# Patient Record
Sex: MALE | Race: WHITE | NOT HISPANIC OR LATINO | Employment: FULL TIME | URBAN - NONMETROPOLITAN AREA
[De-identification: names, ages, dates, MRNs, and addresses within clinical notes are randomized per-mention and may not be internally consistent; named-entity substitution may affect disease eponyms.]

---

## 2021-12-11 ENCOUNTER — APPOINTMENT (OUTPATIENT)
Dept: LAB | Facility: HOSPITAL | Age: 51
End: 2021-12-11
Attending: STUDENT IN AN ORGANIZED HEALTH CARE EDUCATION/TRAINING PROGRAM
Payer: COMMERCIAL

## 2021-12-11 DIAGNOSIS — Z01.818 PREOP TESTING: ICD-10-CM

## 2021-12-11 LAB
ANION GAP SERPL CALCULATED.3IONS-SCNC: 5 MMOL/L (ref 4–13)
BUN SERPL-MCNC: 15 MG/DL (ref 5–25)
CALCIUM SERPL-MCNC: 8.7 MG/DL (ref 8.3–10.1)
CHLORIDE SERPL-SCNC: 97 MMOL/L (ref 100–108)
CO2 SERPL-SCNC: 31 MMOL/L (ref 21–32)
CREAT SERPL-MCNC: 1.31 MG/DL (ref 0.6–1.3)
ERYTHROCYTE [DISTWIDTH] IN BLOOD BY AUTOMATED COUNT: 13.7 % (ref 11.6–15.1)
GFR SERPL CREATININE-BSD FRML MDRD: 63 ML/MIN/1.73SQ M
GLUCOSE P FAST SERPL-MCNC: 245 MG/DL (ref 65–99)
HCT VFR BLD AUTO: 41.3 % (ref 36.5–49.3)
HGB BLD-MCNC: 14.3 G/DL (ref 12–17)
MCH RBC QN AUTO: 28.9 PG (ref 26.8–34.3)
MCHC RBC AUTO-ENTMCNC: 34.6 G/DL (ref 31.4–37.4)
MCV RBC AUTO: 83 FL (ref 82–98)
PLATELET # BLD AUTO: 297 THOUSANDS/UL (ref 149–390)
PMV BLD AUTO: 11 FL (ref 8.9–12.7)
POTASSIUM SERPL-SCNC: 3.4 MMOL/L (ref 3.5–5.3)
RBC # BLD AUTO: 4.95 MILLION/UL (ref 3.88–5.62)
SODIUM SERPL-SCNC: 133 MMOL/L (ref 136–145)
WBC # BLD AUTO: 8.55 THOUSAND/UL (ref 4.31–10.16)

## 2021-12-11 PROCEDURE — 80048 BASIC METABOLIC PNL TOTAL CA: CPT

## 2021-12-11 PROCEDURE — 85027 COMPLETE CBC AUTOMATED: CPT

## 2021-12-11 PROCEDURE — 36415 COLL VENOUS BLD VENIPUNCTURE: CPT

## 2021-12-14 ENCOUNTER — ANESTHESIA EVENT (OUTPATIENT)
Dept: PERIOP | Facility: HOSPITAL | Age: 51
End: 2021-12-14
Payer: COMMERCIAL

## 2021-12-14 RX ORDER — SPIRONOLACTONE 25 MG/1
25 TABLET ORAL DAILY
COMMUNITY

## 2021-12-14 RX ORDER — ATORVASTATIN CALCIUM 40 MG/1
40 TABLET, FILM COATED ORAL DAILY
COMMUNITY

## 2021-12-14 RX ORDER — FUROSEMIDE 40 MG/1
40 TABLET ORAL DAILY
COMMUNITY

## 2021-12-14 RX ORDER — ASPIRIN 81 MG/1
81 TABLET ORAL DAILY
COMMUNITY

## 2021-12-14 RX ORDER — CETIRIZINE HYDROCHLORIDE 10 MG/1
10 TABLET ORAL DAILY
COMMUNITY

## 2021-12-14 RX ORDER — LEVOTHYROXINE SODIUM 0.05 MG/1
50 TABLET ORAL DAILY
COMMUNITY

## 2021-12-14 RX ORDER — LOSARTAN POTASSIUM 100 MG/1
100 TABLET ORAL DAILY
COMMUNITY

## 2021-12-27 PROBLEM — I10 HTN (HYPERTENSION): Status: ACTIVE | Noted: 2021-12-27

## 2021-12-27 PROBLEM — N18.9 CHRONIC KIDNEY DISEASE: Status: ACTIVE | Noted: 2021-12-27

## 2021-12-28 ENCOUNTER — ANESTHESIA (OUTPATIENT)
Dept: PERIOP | Facility: HOSPITAL | Age: 51
End: 2021-12-28
Payer: COMMERCIAL

## 2021-12-28 ENCOUNTER — HOSPITAL ENCOUNTER (OUTPATIENT)
Facility: HOSPITAL | Age: 51
Setting detail: OUTPATIENT SURGERY
Discharge: HOME | End: 2021-12-28
Attending: STUDENT IN AN ORGANIZED HEALTH CARE EDUCATION/TRAINING PROGRAM | Admitting: STUDENT IN AN ORGANIZED HEALTH CARE EDUCATION/TRAINING PROGRAM
Payer: COMMERCIAL

## 2021-12-28 VITALS
WEIGHT: 235 LBS | RESPIRATION RATE: 18 BRPM | BODY MASS INDEX: 34.8 KG/M2 | HEIGHT: 69 IN | SYSTOLIC BLOOD PRESSURE: 148 MMHG | OXYGEN SATURATION: 97 % | HEART RATE: 89 BPM | DIASTOLIC BLOOD PRESSURE: 75 MMHG | TEMPERATURE: 97.9 F

## 2021-12-28 DIAGNOSIS — K42.9 UMBILICAL HERNIA WITHOUT OBSTRUCTION OR GANGRENE: ICD-10-CM

## 2021-12-28 LAB
GLUCOSE SERPL-MCNC: 278 MG/DL (ref 65–140)
GLUCOSE SERPL-MCNC: 290 MG/DL (ref 65–140)

## 2021-12-28 PROCEDURE — 93005 ELECTROCARDIOGRAM TRACING: CPT

## 2021-12-28 PROCEDURE — 82948 REAGENT STRIP/BLOOD GLUCOSE: CPT

## 2021-12-28 PROCEDURE — 88302 TISSUE EXAM BY PATHOLOGIST: CPT | Performed by: PATHOLOGY

## 2021-12-28 PROCEDURE — 49585 PR REPAIR UMBILICAL HERN,5+Y/O,REDUC: CPT

## 2021-12-28 RX ORDER — ONDANSETRON 2 MG/ML
4 INJECTION INTRAMUSCULAR; INTRAVENOUS ONCE AS NEEDED
Status: DISCONTINUED | OUTPATIENT
Start: 2021-12-28 | End: 2021-12-28 | Stop reason: HOSPADM

## 2021-12-28 RX ORDER — MIDAZOLAM HYDROCHLORIDE 2 MG/2ML
INJECTION, SOLUTION INTRAMUSCULAR; INTRAVENOUS AS NEEDED
Status: DISCONTINUED | OUTPATIENT
Start: 2021-12-28 | End: 2021-12-28

## 2021-12-28 RX ORDER — KETOROLAC TROMETHAMINE 30 MG/ML
INJECTION, SOLUTION INTRAMUSCULAR; INTRAVENOUS AS NEEDED
Status: DISCONTINUED | OUTPATIENT
Start: 2021-12-28 | End: 2021-12-28

## 2021-12-28 RX ORDER — HYDROMORPHONE HCL/PF 1 MG/ML
0.25 SYRINGE (ML) INJECTION
Status: DISCONTINUED | OUTPATIENT
Start: 2021-12-28 | End: 2021-12-28 | Stop reason: HOSPADM

## 2021-12-28 RX ORDER — CEFAZOLIN SODIUM 2 G/50ML
2000 SOLUTION INTRAVENOUS ONCE
Status: COMPLETED | OUTPATIENT
Start: 2021-12-28 | End: 2021-12-28

## 2021-12-28 RX ORDER — DEXAMETHASONE SODIUM PHOSPHATE 4 MG/ML
INJECTION, SOLUTION INTRA-ARTICULAR; INTRALESIONAL; INTRAMUSCULAR; INTRAVENOUS; SOFT TISSUE AS NEEDED
Status: DISCONTINUED | OUTPATIENT
Start: 2021-12-28 | End: 2021-12-28

## 2021-12-28 RX ORDER — ROCURONIUM BROMIDE 10 MG/ML
INJECTION, SOLUTION INTRAVENOUS AS NEEDED
Status: DISCONTINUED | OUTPATIENT
Start: 2021-12-28 | End: 2021-12-28

## 2021-12-28 RX ORDER — LIDOCAINE HYDROCHLORIDE AND EPINEPHRINE 10; 10 MG/ML; UG/ML
INJECTION, SOLUTION INFILTRATION; PERINEURAL AS NEEDED
Status: DISCONTINUED | OUTPATIENT
Start: 2021-12-28 | End: 2021-12-28 | Stop reason: HOSPADM

## 2021-12-28 RX ORDER — DIPHENHYDRAMINE HYDROCHLORIDE 50 MG/ML
12.5 INJECTION INTRAMUSCULAR; INTRAVENOUS ONCE AS NEEDED
Status: DISCONTINUED | OUTPATIENT
Start: 2021-12-28 | End: 2021-12-28 | Stop reason: HOSPADM

## 2021-12-28 RX ORDER — FENTANYL CITRATE 50 UG/ML
INJECTION, SOLUTION INTRAMUSCULAR; INTRAVENOUS AS NEEDED
Status: DISCONTINUED | OUTPATIENT
Start: 2021-12-28 | End: 2021-12-28

## 2021-12-28 RX ORDER — ONDANSETRON 2 MG/ML
INJECTION INTRAMUSCULAR; INTRAVENOUS AS NEEDED
Status: DISCONTINUED | OUTPATIENT
Start: 2021-12-28 | End: 2021-12-28

## 2021-12-28 RX ORDER — FENTANYL CITRATE/PF 50 MCG/ML
25 SYRINGE (ML) INJECTION
Status: DISCONTINUED | OUTPATIENT
Start: 2021-12-28 | End: 2021-12-28 | Stop reason: HOSPADM

## 2021-12-28 RX ORDER — PROPOFOL 10 MG/ML
INJECTION, EMULSION INTRAVENOUS AS NEEDED
Status: DISCONTINUED | OUTPATIENT
Start: 2021-12-28 | End: 2021-12-28

## 2021-12-28 RX ORDER — SODIUM CHLORIDE, SODIUM LACTATE, POTASSIUM CHLORIDE, CALCIUM CHLORIDE 600; 310; 30; 20 MG/100ML; MG/100ML; MG/100ML; MG/100ML
INJECTION, SOLUTION INTRAVENOUS CONTINUOUS PRN
Status: DISCONTINUED | OUTPATIENT
Start: 2021-12-28 | End: 2021-12-28

## 2021-12-28 RX ORDER — LIDOCAINE HYDROCHLORIDE 10 MG/ML
INJECTION, SOLUTION EPIDURAL; INFILTRATION; INTRACAUDAL; PERINEURAL AS NEEDED
Status: DISCONTINUED | OUTPATIENT
Start: 2021-12-28 | End: 2021-12-28

## 2021-12-28 RX ADMIN — LIDOCAINE HYDROCHLORIDE 50 MG: 10 INJECTION, SOLUTION EPIDURAL; INFILTRATION; INTRACAUDAL at 10:20

## 2021-12-28 RX ADMIN — SUGAMMADEX 200 MG: 100 INJECTION, SOLUTION INTRAVENOUS at 11:06

## 2021-12-28 RX ADMIN — KETOROLAC TROMETHAMINE 30 MG: 30 INJECTION, SOLUTION INTRAMUSCULAR at 10:57

## 2021-12-28 RX ADMIN — FENTANYL CITRATE 50 MCG: 50 INJECTION INTRAMUSCULAR; INTRAVENOUS at 11:08

## 2021-12-28 RX ADMIN — ONDANSETRON 4 MG: 2 INJECTION INTRAMUSCULAR; INTRAVENOUS at 10:20

## 2021-12-28 RX ADMIN — MIDAZOLAM 2 MG: 1 INJECTION INTRAMUSCULAR; INTRAVENOUS at 10:14

## 2021-12-28 RX ADMIN — ROCURONIUM BROMIDE 50 MG: 50 INJECTION, SOLUTION INTRAVENOUS at 10:20

## 2021-12-28 RX ADMIN — CEFAZOLIN SODIUM 2000 MG: 2 SOLUTION INTRAVENOUS at 10:15

## 2021-12-28 RX ADMIN — PROPOFOL 200 MG: 10 INJECTION, EMULSION INTRAVENOUS at 10:20

## 2021-12-28 RX ADMIN — DEXAMETHASONE SODIUM PHOSPHATE 4 MG: 4 INJECTION, SOLUTION INTRAMUSCULAR; INTRAVENOUS at 10:20

## 2021-12-28 RX ADMIN — SODIUM CHLORIDE, SODIUM LACTATE, POTASSIUM CHLORIDE, AND CALCIUM CHLORIDE: .6; .31; .03; .02 INJECTION, SOLUTION INTRAVENOUS at 10:16

## 2021-12-28 RX ADMIN — FENTANYL CITRATE 50 MCG: 50 INJECTION INTRAMUSCULAR; INTRAVENOUS at 10:20

## 2021-12-28 RX ADMIN — INSULIN HUMAN 5 UNITS: 100 INJECTION, SOLUTION PARENTERAL at 10:28

## 2021-12-29 LAB
ATRIAL RATE: 87 BPM
P AXIS: 57 DEGREES
PR INTERVAL: 170 MS
QRS AXIS: 38 DEGREES
QRSD INTERVAL: 96 MS
QT INTERVAL: 382 MS
QTC INTERVAL: 459 MS
T WAVE AXIS: 43 DEGREES
VENTRICULAR RATE: 87 BPM

## 2024-05-04 ENCOUNTER — HOSPITAL ENCOUNTER (OUTPATIENT)
Dept: ULTRASOUND IMAGING | Facility: HOSPITAL | Age: 54
Discharge: HOME | End: 2024-05-04
Payer: COMMERCIAL

## 2024-05-04 DIAGNOSIS — R22.33 AXILLARY LUMP, BILATERAL: ICD-10-CM

## 2024-05-04 PROCEDURE — 76882 US LMTD JT/FCL EVL NVASC XTR: CPT

## 2024-05-08 DIAGNOSIS — R59.0 LOCALIZED ENLARGED LYMPH NODES: ICD-10-CM

## 2024-05-08 DIAGNOSIS — R22.33 LOCALIZED SWELLING, MASS AND LUMP, UPPER LIMB, BILATERAL: ICD-10-CM

## 2024-05-16 ENCOUNTER — HOSPITAL ENCOUNTER (OUTPATIENT)
Dept: CT IMAGING | Facility: HOSPITAL | Age: 54
Discharge: HOME | End: 2024-05-16
Payer: COMMERCIAL

## 2024-05-16 DIAGNOSIS — R22.33 LOCALIZED SWELLING, MASS AND LUMP, UPPER LIMB, BILATERAL: ICD-10-CM

## 2024-05-16 DIAGNOSIS — R59.0 LOCALIZED ENLARGED LYMPH NODES: ICD-10-CM

## 2024-05-16 PROCEDURE — 71260 CT THORAX DX C+: CPT

## 2024-05-16 RX ADMIN — IOHEXOL 85 ML: 350 INJECTION, SOLUTION INTRAVENOUS at 16:47

## 2024-07-12 ENCOUNTER — CONSULT (OUTPATIENT)
Dept: PAIN MEDICINE | Facility: CLINIC | Age: 54
End: 2024-07-12
Payer: COMMERCIAL

## 2024-07-12 VITALS
SYSTOLIC BLOOD PRESSURE: 122 MMHG | HEART RATE: 70 BPM | BODY MASS INDEX: 33.77 KG/M2 | RESPIRATION RATE: 18 BRPM | DIASTOLIC BLOOD PRESSURE: 78 MMHG | OXYGEN SATURATION: 96 % | WEIGHT: 228 LBS | TEMPERATURE: 97.3 F | HEIGHT: 69 IN

## 2024-07-12 DIAGNOSIS — M54.12 CERVICAL RADICULOPATHY: Primary | ICD-10-CM

## 2024-07-12 PROCEDURE — 99244 OFF/OP CNSLTJ NEW/EST MOD 40: CPT | Performed by: ANESTHESIOLOGY

## 2024-07-12 RX ORDER — MELOXICAM 7.5 MG/1
7.5 TABLET ORAL 2 TIMES DAILY PRN
Qty: 60 TABLET | Refills: 2 | Status: SHIPPED | OUTPATIENT
Start: 2024-07-12

## 2024-07-12 RX ORDER — GABAPENTIN 300 MG/1
300 CAPSULE ORAL 3 TIMES DAILY
Qty: 90 CAPSULE | Refills: 2 | Status: SHIPPED | OUTPATIENT
Start: 2024-07-12

## 2024-07-12 NOTE — PATIENT INSTRUCTIONS
Patient Education     Neck Pain Exercises   About this topic   The neck or cervical spine has 7 spinal bones that run from the base of your skull to the upper back. These spinal bones have discs in between them. Discs act as shock absorbers. Ligaments are strong bands of tissue that hold the bones together. Many muscles surround and attach on these bones. Nerves come off of the spinal cord and exit out of small spaces in between the spinal bones. You can have neck pain if any of these are injured or damaged. Exercises may help to make this problem better.  General   Before starting with a program, ask your doctor if you are healthy enough to do these exercises. Your doctor may have you work with a  or physical therapist to make a safe exercise program to meet your needs. You should not do the exercises if they cause sharp pains, if you feel dizzy, or if you have vision changes.  Stretching Exercises   Stretching exercises keep your muscles flexible. They also stop them from getting tight. Start by doing each of these stretches 2 to 3 times. In order for your body to make changes, you will need to hold these stretches for 20 to 30 seconds. Try to do the stretches 2 to 3 times each day. Do all exercises slowly.  Passive neck stretches:  Put your left hand on top of your head. Your other arm can be at your side or behind your back. Pull your head toward your left shoulder until you feel a gentle stretch on the right side of your neck. Repeat on the other side using your other hand.  Also, try this stretch by pulling in a diagonal direction. With your left hand on top of your head, pull your head down towards the direction of your left knee. You should feel this stretch toward the back on the right side of your neck. Repeat on the other side.  Active neck stretches:  Neck front-to-back motion ? Look down to the floor until you feel a stretch in the back of your neck. Hold. Next, look up to the ceiling until you  feel a stretch in the front of your neck. Hold.  Neck side-to-side motion ? Tilt your head to the side and bring your ear to your shoulder until you feel a stretch on the other side of your neck. Hold. Next, tilt your head to the other side until you feel a stretch. Hold.  Neck turning ? Turn only your head and look over your left shoulder until you feel stretching in the right side of your neck. Hold. Now turn only your head and look over your right shoulder until you feel a stretch in the left side of your neck. Hold.  Scalene stretches ? Grasp your head with the hand opposite the side you want to stretch. Pull your head to the side until you feel a stretch. Now, slowly turn your head so your chin is pointed upwards.  Chin tucks ? Stand straight or lie down on your back. Tuck your chin in and lengthen the back of your neck. Return to the starting position and repeat. It may help to stand up against a wall during this exercise. Try gently pushing your chin with two fingers while trying to flatten your neck against the wall. If you do this exercise lying down, try using a small rolled up washcloth under your neck. Push down into the washcloth when tucking in your chin.  Strengthening Exercises   Strengthening exercises keep your muscles firm and strong. Start by repeating each exercise 2 to 3 times. Work up to doing each exercise 10 times. Try to do the exercises 2 to 3 times each day. Hold each exercise for 3 to 5 seconds. Do all exercises slowly.  Shoulder blade squeezes ? Pinch your shoulder blades together on your upper back and hold 3 to 5 seconds. Relax.             What will the results be?   Less pain and stiffness  Better range of motion  Increased strength  Help you heal faster after an injury or surgery  Increase blood flow to a body part  Help you feel better and more relaxed  Give you more energy  More toned looking muscles  Better posture  Easier to do daily activities  Helpful tips   Stay active and  work out to keep your muscles strong and flexible.  Be sure you do not hold your breath when exercising. This can raise your blood pressure. If you tend to hold your breath, try counting out loud when exercising. If any exercise bothers you, stop right away.  Try swinging your arms at an easy pace for a few minutes to warm up your muscles. Do this again after exercising.  Doing exercises before a meal may be a good way to get into a routine.  Exercise may be slightly uncomfortable, but you should not have sharp pains. If you do get sharp pains, stop what you are doing. If the sharp pains continue, call your doctor.  Last Reviewed Date   2020-03-10  Consumer Information Use and Disclaimer   This generalized information is a limited summary of diagnosis, treatment, and/or medication information. It is not meant to be comprehensive and should be used as a tool to help the user understand and/or assess potential diagnostic and treatment options. It does NOT include all information about conditions, treatments, medications, side effects, or risks that may apply to a specific patient. It is not intended to be medical advice or a substitute for the medical advice, diagnosis, or treatment of a health care provider based on the health care provider's examination and assessment of a patient’s specific and unique circumstances. Patients must speak with a health care provider for complete information about their health, medical questions, and treatment options, including any risks or benefits regarding use of medications. This information does not endorse any treatments or medications as safe, effective, or approved for treating a specific patient. [employer] and its affiliates disclaim any warranty or liability relating to this information or the use thereof. The use of this information is governed by the Terms of Use, available at https://www.[employer] Build[employer]uwer.com/en/know/clinical-effectiveness-terms   Copyright   Copyright ©  2024 [employer] and its affiliates and/or licensors. All rights reserved.
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
## 2024-07-24 ENCOUNTER — EVALUATION (OUTPATIENT)
Dept: PHYSICAL THERAPY | Facility: CLINIC | Age: 54
End: 2024-07-24
Payer: COMMERCIAL

## 2024-07-24 DIAGNOSIS — M54.12 CERVICAL RADICULOPATHY: ICD-10-CM

## 2024-07-24 PROCEDURE — 97162 PT EVAL MOD COMPLEX 30 MIN: CPT

## 2024-07-24 PROCEDURE — 97535 SELF CARE MNGMENT TRAINING: CPT

## 2024-07-24 NOTE — PROGRESS NOTES
PT Evaluation     Today's date: 2024  Patient name: Moise Flynn  : 1970  MRN: [id number]  Referring provider: Bhavesh Jha MD  Dx:   Encounter Diagnosis     ICD-10-CM    1. Cervical radiculopathy  M54.12 Ambulatory referral to Physical Therapy                     Assessment  Impairments: abnormal or restricted ROM, activity intolerance, impaired physical strength, lacks appropriate home exercise program, pain with function, unable to perform ADL, activity limitations and endurance  Symptom irritability: moderate    Assessment details: Pt is a 54 year old male who presents to OP PT for cervical radiculopathy. Upon examination, patient presents with (+) neural tension, (+) Spurlings, decreased ROM and WFL UE strength. Due to his current impairments patient has difficulty with sleeping, prolonged positioning of UE, driving and performing hobbies. Pt would benefit from OP PT services in order to address current impairments and functional limitations. Thank you for your referral!          Goals  STG (to be met within 4 weeks):  1.  Pt will reports no more than 4/10 pain at worst in order to improve function   2. Pt will improve cervical flexion and extension ROM by at least 5* in order to improve cervical mobility  3. Pt will improve cervical R& L lateral flexion ROM to next least restrictive level in order to improve cervical mobility  4. Pt will improve cervical R& L rotation ROM to next least restrictive level in order to improve cervical mobility  5. Pt will improve cervical vertebrae mobility to WFL in order to improve posture  6. Pt will improve FOTO score by at least 5 points in order to progress to prior level of function    LTG (to be met in 8 weeks):  1. Pt will report no more than 2/10 pain at worst in order to complete ADLs  2. Pt will be able to tolerate work related activities without radicular symptoms in order to improve function  3. Pt will be able to perform ADLs and hobbies without  pain in order to return to PLOF  4. Pt will achieve FOTO discharge in order to improve QOL  5. Pt to be independent with HEP     Plan  Patient would benefit from: skilled physical therapy  Planned modality interventions: thermotherapy: hydrocollator packs and cryotherapy    Planned therapy interventions: joint mobilization, manual therapy, neuromuscular re-education, patient education, strengthening, stretching, therapeutic exercise, home exercise program and balance    Frequency: 2x week  Duration in weeks: 6  Treatment plan discussed with: patient  Plan details: PT discussed goals and expectations of OPPT; pt understanding of POC and HEP provided.      Subjective Evaluation    History of Present Illness  Mechanism of injury: In April he was umping and had a baseball hit his L shoulder. It has been constantly sore since. Over the past 2-3 months, he has been dealing with numbness in his L arm and pain in his forearm. At times, his hand will go completely numb. Currently taking Meloxicam and Gabapentin, his sleeping has been slightly better since starting them. He typically does not have neck pain but he will get some discomfort if he is in a sustained position such as being on the computer or his phone.   Patient Goals  Patient goals for therapy: increased strength, independence with ADLs/IADLs, return to sport/leisure activities, increased motion, improved balance and decreased pain    Pain  Current pain ratin  At best pain ratin  At worst pain ratin  Location: Entire LUE  Quality: radiating, throbbing, dull ache and pressure    Hand dominance: left    Treatments  Current treatment: medication and physical therapy        Objective     Tenderness   Cervical Spine   No tenderness in the spinous process.     Neurological Testing     Sensation   Cervical/Thoracic   Left   Intact: light touch  Diminished: light touch    Right   Intact: light touch    Comments   Left light touch: Diminished 2-4 fingers.      Active Range of Motion   Cervical/Thoracic Spine       Cervical    Flexion:  WFL  Extension:  Restriction level: minimal  Left lateral flexion:  Restriction level: moderate  Right lateral flexion:  Restriction level moderate  Left rotation:  Restriction level: minimal  Right rotation:  Restriction level: minimal    Joint Play     Hypomobile: C4, C5, C6 and C7     Strength/Myotome Testing   Cervical Spine     Left   Normal strength    Right   Normal strength    Tests   Cervical     Left   Positive Spurling's Test A.     Right   Negative Spurling's Test A.     Left Shoulder   Positive ULTT3.       Flowsheet Rows      Flowsheet Row Most Recent Value   PT/OT G-Codes    Current Score 61   Projected Score 70               Precautions: HTN  POC Expiration: 8/24/24  Manuals 7/24       Cervical mobs grade III-IV, STM        Manual traction/SOR        PA T/S mobs                Neuro Re-Ed        Chin tucks        Chin tuck with OP        Chin tuck with extension        Chin tuck with rotation        Prone Y, T, I                                        TherEx        UBE to improve ROM/postural awareness        Doorway stretch        Can shoulder /, IR        TB ER        TB 90/90        Cervical SNAGs                        Instructed HEP & education 10'       Modalities

## 2024-07-26 NOTE — PROGRESS NOTES
"Daily Note     Today's date: 2024  Patient name: Moise Flynn  : 1970  MRN: [id number]  Referring provider: Bhavesh Jha MD  Dx:   Encounter Diagnosis     ICD-10-CM    1. Cervical radiculopathy  M54.12                      Subjective: Patient reports to PT ready and enthusiastic to begin his program this afternoon.      Objective: See treatment diary below      Assessment: Tolerated treatment well. Patient exhibited good technique with therapeutic exercises and would benefit from continued PT to increase cervical ROM/strength and endurance to improve his mobility.      Plan: Continue per plan of care.      Precautions: HTN  POC Expiration: 24  Manuals       Cervical mobs grade III-IV, STM  10'      Manual traction/SOR  2'      PA T/S mobs  3'              Neuro Re-Ed        Chin tucks  10x5\"      Chin tuck with OP        Chin tuck with extension        Chin tuck with rotation        Prone Y, T, I  2x10ea                                      TherEx        UBE to improve ROM/postural awareness  10' 1.0alt      Doorway stretch   3 Way 3x15\"ea      Can shoulder /, IR  NV      TB ER        TB 90/90        Cervical SNAGs  5x15\"bilat                      Instructed HEP & education 10'       Modalities                         "

## 2024-07-29 ENCOUNTER — OFFICE VISIT (OUTPATIENT)
Dept: PHYSICAL THERAPY | Facility: CLINIC | Age: 54
End: 2024-07-29
Payer: COMMERCIAL

## 2024-07-29 DIAGNOSIS — M54.12 CERVICAL RADICULOPATHY: Primary | ICD-10-CM

## 2024-07-29 PROCEDURE — 97140 MANUAL THERAPY 1/> REGIONS: CPT

## 2024-07-29 PROCEDURE — 97112 NEUROMUSCULAR REEDUCATION: CPT

## 2024-07-29 PROCEDURE — 97110 THERAPEUTIC EXERCISES: CPT

## 2024-08-01 ENCOUNTER — OFFICE VISIT (OUTPATIENT)
Dept: PHYSICAL THERAPY | Facility: CLINIC | Age: 54
End: 2024-08-01
Payer: COMMERCIAL

## 2024-08-01 DIAGNOSIS — M54.12 CERVICAL RADICULOPATHY: Primary | ICD-10-CM

## 2024-08-01 PROCEDURE — 97140 MANUAL THERAPY 1/> REGIONS: CPT

## 2024-08-01 PROCEDURE — 97110 THERAPEUTIC EXERCISES: CPT

## 2024-08-01 PROCEDURE — 97112 NEUROMUSCULAR REEDUCATION: CPT

## 2024-08-01 NOTE — PROGRESS NOTES
"Daily Note     Today's date: 2024  Patient name: Moise Flynn  : 1970  MRN: [id number]  Referring provider: Bhavesh Jha MD  Dx:   Encounter Diagnosis     ICD-10-CM    1. Cervical radiculopathy  M54.12                      Subjective: Patient notes that he had a difficult time sleeping post last session; however, does note that he feels improvement in tightness since first session.      Objective: See treatment diary below      Assessment: Tolerated treatment well. Patient exhibited good technique with therapeutic exercises and would benefit from continued PT to increase cervical ROM/strength and endurance to improve his mobility.      Plan: Continue per plan of care.      Precautions: HTN  POC Expiration: 24  Manuals      Cervical mobs grade III-IV, STM  10' 5'     Manual traction/SOR  2' 5'     PA T/S mobs  3' 5'             Neuro Re-Ed       Chin tucks  10x5\" 10x5\"     Chin tuck with OP        Chin tuck with extension        Chin tuck with rotation        Prone Y, T, I  2x10ea 2x10ea                                     TherEx       UBE to improve ROM/postural awareness  10' 1.0alt 10' 1.0alt     Doorway stretch   3 Way 3x15\"ea 3 Way   3x15\"zenon     Cane shoulder /, IR  NV 10x5\"zenon     TB ER        TB 90/90        Cervical SNAGs  5x15\"bilat 5x15\"bilat     UT/LS Stretch   5x15\"zenon bilat             Instructed HEP & education 10'       Modalities           15'MH to   bilat UB               "

## 2024-08-02 NOTE — PROGRESS NOTES
"Daily Note     Today's date: 2024  Patient name: Moise Flynn  : 1970  MRN: [id number]  Referring provider: Bhavesh Jha MD  Dx:   Encounter Diagnosis     ICD-10-CM    1. Cervical radiculopathy  M54.12                      Subjective: Pt reports feeling good overall today      Objective: See treatment diary below      Assessment:  Pt tolerated treatment session well. Initially R rotation limitation >L, responding well to cervical mobilizations and improving. Overall progressing towards goals. Pt would benefit from continued OP PT services in order to address ongoing impairments and improve functional activity limitations.       Plan: Continue per plan of care.      Precautions: HTN  POC Expiration: 24  Manuals     Cervical mobs grade III-IV, STM  10' 5' 5'    Manual traction/SOR  2' 5' 5'    PA T/S mobs  3' 5' 5'            Neuro Re-Ed       Chin tucks  10x5\" 10x5\" 10x5\"    Chin tuck with OP        Chin tuck with extension        Chin tuck with rotation        Prone Y, T, I  2x10ea 2x10ea 2x10 ea                                    TherEx       UBE to improve ROM/postural awareness  10' 1.0alt 10' 1.0alt 10' 1.6alt    Doorway stretch   3 Way 3x15\"ea 3 Way   3x15\"ea 3x15\"     Cane shoulder /, IR  NV 10x5\"ea 10x5\"ea    TB ER        TB 90/90        Cervical SNAGs  5x15\"bilat 5x15\"bilat 5x15\"bilat    UT/LS Stretch   5x15\"ea bilat 5x15\"bilat ea            Instructed HEP & education 10'       Modalities           15'MH to   bilat UB 15'MH to   bilat UB                "

## 2024-08-05 ENCOUNTER — OFFICE VISIT (OUTPATIENT)
Dept: PHYSICAL THERAPY | Facility: CLINIC | Age: 54
End: 2024-08-05
Payer: COMMERCIAL

## 2024-08-05 DIAGNOSIS — M54.12 CERVICAL RADICULOPATHY: Primary | ICD-10-CM

## 2024-08-05 PROCEDURE — 97110 THERAPEUTIC EXERCISES: CPT

## 2024-08-05 PROCEDURE — 97112 NEUROMUSCULAR REEDUCATION: CPT

## 2024-08-05 PROCEDURE — 97140 MANUAL THERAPY 1/> REGIONS: CPT

## 2024-08-08 ENCOUNTER — OFFICE VISIT (OUTPATIENT)
Dept: PHYSICAL THERAPY | Facility: CLINIC | Age: 54
End: 2024-08-08
Payer: COMMERCIAL

## 2024-08-08 DIAGNOSIS — M54.12 CERVICAL RADICULOPATHY: Primary | ICD-10-CM

## 2024-08-08 PROCEDURE — 97110 THERAPEUTIC EXERCISES: CPT

## 2024-08-08 PROCEDURE — 97140 MANUAL THERAPY 1/> REGIONS: CPT

## 2024-08-08 PROCEDURE — 97112 NEUROMUSCULAR REEDUCATION: CPT

## 2024-08-08 NOTE — PROGRESS NOTES
"Daily Note     Today's date: 2024  Patient name: Moise Flynn  : 1970  MRN: [id number]  Referring provider: Bhavesh Jha MD  Dx:   Encounter Diagnosis     ICD-10-CM    1. Cervical radiculopathy  M54.12           Start Time: 1415  Stop Time: 1500  Total time in clinic (min): 45 minutes    Subjective: Patient reports tingling into LUE especially when sleeping at night.       Objective: See treatment diary below      Assessment: Tolerated treatment well. Patient presented with tingling throughout digits 1-5 on L hand with minimal improvement with median/ulnar nerve glides. Patient demonstrated fatigue post treatment, exhibited good technique with therapeutic exercises, and would benefit from continued PT to improve cervical mobility and strength to reduce radicular symptoms.      Plan: Continue per plan of care.      Precautions: HTN  POC Expiration: 24  Manuals    Cervical mobs grade III-IV, STM  10' 5' 5' 5'   Manual traction/SOR  2' 5' 5' 5'   PA T/S mobs  3' 5' 5' 5'           Neuro Re-Ed       Chin tucks  10x5\" 10x5\" 10x5\" 10x5\"   Chin tuck with OP        Chin tuck with extension        Chin tuck with rotation        Prone Y, T, I  2x10ea 2x10ea 2x10 ea 2x10 ea                                    TherEx       UBE to improve ROM/postural awareness  10' 1.0alt 10' 1.0alt 10' 1.6alt 10' 1.6 alt   Doorway stretch   3 Way 3x15\"ea 3 Way   3x15\"ea 3x15\"  3x15\" 3 way   Cane shoulder /, IR  NV 10x5\"ea 10x5\"ea 10x5\" ea    TB ER        TB 90/90        Cervical SNAGs  5x15\"bilat 5x15\"bilat 5x15\"bilat 5x15\"   UT/LS Stretch   5x15\"ea bilat 5x15\"bilat ea 5x15\" bilat ea            Instructed HEP & education 10'       Modalities           15'MH to   bilat UB 15'MH to   bilat UB declined                 "

## 2024-08-12 ENCOUNTER — OFFICE VISIT (OUTPATIENT)
Dept: PHYSICAL THERAPY | Facility: CLINIC | Age: 54
End: 2024-08-12
Payer: COMMERCIAL

## 2024-08-12 DIAGNOSIS — M54.12 CERVICAL RADICULOPATHY: Primary | ICD-10-CM

## 2024-08-12 PROCEDURE — 97110 THERAPEUTIC EXERCISES: CPT

## 2024-08-12 PROCEDURE — 97140 MANUAL THERAPY 1/> REGIONS: CPT

## 2024-08-12 PROCEDURE — 97112 NEUROMUSCULAR REEDUCATION: CPT

## 2024-08-12 NOTE — PROGRESS NOTES
"Daily Note     Today's date: 2024  Patient name: Moise Flynn  : 1970  MRN: [id number]  Referring provider: Bhavesh Jha MD  Dx:   Encounter Diagnosis     ICD-10-CM    1. Cervical radiculopathy  M54.12                      Subjective: Patient reports that he has been consistently feeling well.        Objective: See treatment diary below      Assessment: Tolerated treatment well. Patient exhibited good technique with therapeutic exercises and would benefit from continued PT to increase cervical ROM/strength and endurance to improve his mobility.      Plan: Continue per plan of care.      Precautions: HTN  POC Expiration: 24  Manuals    Cervical mobs grade III-IV, STM 10' 5' 5' 5' 5'   Manual traction/SOR 2' 5' 5' 5' 5'   PA T/S mobs 3' 5' 5' 5' 5'           Neuro Re-Ed    Chin tucks 10x5\" 10x5\" 10x5\" 10x5\" 10x5\"   Chin tuck with OP        Chin tuck with extension        Chin tuck with rotation        Prone Y, T, I 2x10ea 2x10ea 2x10 ea 2x10 ea  2x10ea                                   TherEx    UBE to improve ROM/postural awareness 10' 1.0alt 10' 1.0alt 10' 1.6alt 10' 1.6 alt 10' 2.0alt   Doorway stretch  3 Way 3x15\"ea 3 Way   3x15\"ea 3x15\"  3x15\" 3 way 3x15\"ea 3way   Cane shoulder /, IR NV 10x5\"ea 10x5\"ea 10x5\" ea  10x5\"ea   TB ER        TB 90/90        Cervical SNAGs 5x15\"bilat 5x15\"bilat 5x15\"bilat 5x15\" 5x15\"bilat   UT/LS Stretch  5x15\"ea bilat 5x15\"bilat ea 5x15\" bilat ea  5x15\"ea bilat           Instructed HEP & education        Modalities       15'MH to   bilat UB 15'MH to   bilat UB declined 15'MH to bilat UB                   "

## 2024-08-15 ENCOUNTER — OFFICE VISIT (OUTPATIENT)
Dept: PHYSICAL THERAPY | Facility: CLINIC | Age: 54
End: 2024-08-15
Payer: COMMERCIAL

## 2024-08-15 DIAGNOSIS — M54.12 CERVICAL RADICULOPATHY: Primary | ICD-10-CM

## 2024-08-15 PROCEDURE — 97112 NEUROMUSCULAR REEDUCATION: CPT

## 2024-08-15 PROCEDURE — 97140 MANUAL THERAPY 1/> REGIONS: CPT

## 2024-08-15 PROCEDURE — 97110 THERAPEUTIC EXERCISES: CPT

## 2024-08-15 NOTE — PROGRESS NOTES
"Daily Note     Today's date: 8/15/2024  Patient name: Moise Flynn  : 1970  MRN: [id number]  Referring provider: Bhavesh Jha MD  Dx:   Encounter Diagnosis     ICD-10-CM    1. Cervical radiculopathy  M54.12                      Subjective: Pt reports feeling good today.      Objective: See treatment diary below      Assessment:  Pt tolerated treatment session fairly well. ROM and flexibility improving post MT. Tolerating exercises well without increase in sx's. Overall progressing towards goals. Pt would benefit from continued OP PT services in order to address ongoing impairments and improve functional activity limitations.      Plan: Continue per plan of care.      Precautions: HTN  POC Expiration: 24  Manuals 8/1 8/5 8/8 8/12 8/15   Cervical mobs grade III-IV, STM 5' 5' 5' 5' 5'   Manual traction/SOR 5' 5' 5' 5' 5'   PA T/S mobs 5' 5' 5' 5' 5'           Neuro Re-Ed 8/1   8/12 8/15   Chin tucks 10x5\" 10x5\" 10x5\" 10x5\"    Chin tuck with OP        Chin tuck with extension        Chin tuck with rotation        Prone Y, T, I 2x10ea 2x10 ea 2x10 ea  2x10ea 2x10ea                                   TherEx 8/1   8/12 8/15   UBE to improve ROM/postural awareness 10' 1.0alt 10' 1.6alt 10' 1.6 alt 10' 2.0alt 10' 2.0alt   Doorway stretch 3 Way   3x15\"ea 3x15\"  3x15\" 3 way 3x15\"ea 3way 3x15\"ea 3way   Cane shoulder /, IR 10x5\"ea 10x5\"ea 10x5\" ea  10x5\"ea 10x5\"ea   TB ER        TB 90/90        Cervical SNAGs 5x15\"bilat 5x15\"bilat 5x15\" 5x15\"bilat 5x15\"bilat   UT/LS Stretch 5x15\"ea bilat 5x15\"bilat ea 5x15\" bilat ea  5x15\"ea bilat 5x15\"ea bilat           Instructed HEP & education        Modalities  8/1   8/12 8/15    15'MH to   bilat UB 15'MH to   bilat UB declined 15'MH to bilat UB declined                     "

## 2024-08-19 ENCOUNTER — APPOINTMENT (OUTPATIENT)
Dept: PHYSICAL THERAPY | Facility: CLINIC | Age: 54
End: 2024-08-19
Payer: COMMERCIAL

## 2024-08-19 NOTE — PROGRESS NOTES
PT Re-Evaluation     Today's date: 2024  Patient name: Moise Flynn  : 1970  MRN: [id number]  Referring provider: Bhavesh Jha MD  Dx:   No diagnosis found.                 Assessment  Impairments: abnormal or restricted ROM, activity intolerance, impaired physical strength, lacks appropriate home exercise program, pain with function, unable to perform ADL, activity limitations and endurance  Symptom irritability: moderate    Assessment details: Pt has attended a total of *** PT sessions and has made *** progress towards goals. *he has made progress regarding ***. Pt continues to have objective impairments including *** along with functional limitations including ***. Pt would benefit from continued OP PT services in order to address remaining deficits and limitations. Thank you!            Goals  STG (to be met within 4 weeks):  1.  Pt will reports no more than 4/10 pain at worst in order to improve function   2. Pt will improve cervical flexion and extension ROM by at least 5* in order to improve cervical mobility  3. Pt will improve cervical R& L lateral flexion ROM to next least restrictive level in order to improve cervical mobility  4. Pt will improve cervical R& L rotation ROM to next least restrictive level in order to improve cervical mobility  5. Pt will improve cervical vertebrae mobility to WFL in order to improve posture  6. Pt will improve FOTO score by at least 5 points in order to progress to prior level of function    LTG (to be met in 8 weeks):  1. Pt will report no more than 2/10 pain at worst in order to complete ADLs  2. Pt will be able to tolerate work related activities without radicular symptoms in order to improve function  3. Pt will be able to perform ADLs and hobbies without pain in order to return to PLOF  4. Pt will achieve FOTO discharge in order to improve QOL  5. Pt to be independent with HEP     Plan  Patient would benefit from: skilled physical therapy  Planned  modality interventions: thermotherapy: hydrocollator packs and cryotherapy    Planned therapy interventions: joint mobilization, manual therapy, neuromuscular re-education, patient education, strengthening, stretching, therapeutic exercise, home exercise program and balance    Frequency: 2x week  Duration in weeks: 6  Treatment plan discussed with: patient  Plan details: PT discussed goals and expectations of OPPT; pt understanding of POC and HEP provided.      Subjective Evaluation    History of Present Illness  Mechanism of injury: In April he was umping and had a baseball hit his L shoulder. It has been constantly sore since. Over the past 2-3 months, he has been dealing with numbness in his L arm and pain in his forearm. At times, his hand will go completely numb. Currently taking Meloxicam and Gabapentin, his sleeping has been slightly better since starting them. He typically does not have neck pain but he will get some discomfort if he is in a sustained position such as being on the computer or his phone.   Patient Goals  Patient goals for therapy: increased strength, independence with ADLs/IADLs, return to sport/leisure activities, increased motion, improved balance and decreased pain    Pain  Current pain ratin  At best pain ratin  At worst pain ratin  Location: Entire LUE  Quality: radiating, throbbing, dull ache and pressure    Hand dominance: left    Treatments  Current treatment: medication and physical therapy        Objective     Tenderness   Cervical Spine   No tenderness in the spinous process.     Neurological Testing     Sensation   Cervical/Thoracic   Left   Intact: light touch  Diminished: light touch    Right   Intact: light touch    Comments   Left light touch: Diminished 2-4 fingers.     Active Range of Motion   Cervical/Thoracic Spine       Cervical    Flexion:  WFL  Extension:  Restriction level: minimal  Left lateral flexion:  Restriction level: moderate  Right lateral flexion:   "Restriction level moderate  Left rotation:  Restriction level: minimal  Right rotation:  Restriction level: minimal    Joint Play     Hypomobile: C4, C5, C6 and C7     Strength/Myotome Testing   Cervical Spine     Left   Normal strength    Right   Normal strength    Tests   Cervical     Left   Positive Spurling's Test A.     Right   Negative Spurling's Test A.     Left Shoulder   Positive ULTT3.                Precautions: HTN  POC Expiration: 9/19/24  Manuals 8/19 8/5 8/8 8/12 8/15   Cervical mobs grade III-IV, STM  5' 5' 5' 5'   Manual traction/SOR  5' 5' 5' 5'   PA T/S mobs  5' 5' 5' 5'           Neuro Re-Ed    8/12 8/15   Chin tucks  10x5\" 10x5\" 10x5\"    Chin tuck with OP        Chin tuck with extension        Chin tuck with rotation        Prone Y, T, I  2x10 ea 2x10 ea  2x10ea 2x10ea                                   TherEx    8/12 8/15   UBE to improve ROM/postural awareness  10' 1.6alt 10' 1.6 alt 10' 2.0alt 10' 2.0alt   Doorway stretch  3x15\"  3x15\" 3 way 3x15\"ea 3way 3x15\"ea 3way   Cane shoulder /, IR  10x5\"ea 10x5\" ea  10x5\"ea 10x5\"ea   TB ER        TB 90/90        Cervical SNAGs  5x15\"bilat 5x15\" 5x15\"bilat 5x15\"bilat   UT/LS Stretch  5x15\"bilat ea 5x15\" bilat ea  5x15\"ea bilat 5x15\"ea bilat           Instructed HEP & education        Modalities     8/12 8/15     15'MH to   bilat UB declined 15'MH to bilat UB declined             "

## 2024-08-22 ENCOUNTER — EVALUATION (OUTPATIENT)
Dept: PHYSICAL THERAPY | Facility: CLINIC | Age: 54
End: 2024-08-22
Payer: COMMERCIAL

## 2024-08-22 DIAGNOSIS — M54.12 CERVICAL RADICULOPATHY: Primary | ICD-10-CM

## 2024-08-22 PROCEDURE — 97110 THERAPEUTIC EXERCISES: CPT

## 2024-08-22 PROCEDURE — 97140 MANUAL THERAPY 1/> REGIONS: CPT

## 2024-08-22 PROCEDURE — 97112 NEUROMUSCULAR REEDUCATION: CPT

## 2024-08-22 NOTE — PROGRESS NOTES
PT Re-Evaluation     Today's date: 2024  Patient name: Moise Flynn  : 1970  MRN: [id number]  Referring provider: Bhavesh Jha MD  Dx:   Encounter Diagnosis     ICD-10-CM    1. Cervical radiculopathy  M54.12                        Assessment  Impairments: abnormal or restricted ROM, activity intolerance, impaired physical strength, lacks appropriate home exercise program, pain with function, unable to perform ADL, activity limitations and endurance  Symptom irritability: moderate    Assessment details: Pt has attended a total of 8 PT sessions and has not made significant progress towards goals. He continues to deal with pain in his neck, nerve pain in LUE and difficulty sleeping. Objectively, he has improved cervical ROM and cervical mobility but continues to demonstrate (+) cervical radiculopathy cluster. Discussed returning to referring provider while continuing PT to discuss further management of symptoms. Pt would benefit from continued OP PT services in order to address remaining deficits and limitations. Thank you!           Goals  STG (to be met within 4 weeks):  1.  Pt will reports no more than 4/10 pain at worst in order to improve function   progressing  2. Pt will improve cervical flexion and extension ROM by at least 5* in order to improve cervical mobility  met  3. Pt will improve cervical R& L lateral flexion ROM to next least restrictive level in order to improve cervical mobility  met  4. Pt will improve cervical R& L rotation ROM to next least restrictive level in order to improve cervical mobility  met  5. Pt will improve cervical vertebrae mobility to WFL in order to improve posture  met  6. Pt will improve FOTO score by at least 5 points in order to progress to prior level of function met    LTG (to be met in 8 weeks):  1. Pt will report no more than 2/10 pain at worst in order to complete ADLs  progressing  2. Pt will be able to tolerate work related activities without  radicular symptoms in order to improve function  progressing  3. Pt will be able to perform ADLs and hobbies without pain in order to return to PLOF  progressing  4. Pt will achieve FOTO discharge in order to improve QOL   progressing  5. Pt to be independent with HEP   progressing    Plan  Patient would benefit from: skilled physical therapy  Planned modality interventions: thermotherapy: hydrocollator packs and cryotherapy    Planned therapy interventions: joint mobilization, manual therapy, neuromuscular re-education, patient education, strengthening, stretching, therapeutic exercise, home exercise program and balance    Frequency: 2x week  Duration in weeks: 6  Treatment plan discussed with: patient  Plan details: PT discussed goals and expectations of OPPT; pt understanding of POC.      Subjective Evaluation    History of Present Illness  Mechanism of injury: PT reports no significant improvement in symptoms since starting PT. He notes that his pain is not typically present during the day and is mostly sleeping at night. Can tolerate sleeping on his belly but not his back or his side due to the pain in his entire LUE. He also notes he will feel it if driving too long  Patient Goals  Patient goals for therapy: increased strength, independence with ADLs/IADLs, return to sport/leisure activities, increased motion, improved balance and decreased pain    Pain  Current pain ratin  At best pain ratin  At worst pain ratin  Location: Entire LUE  Quality: radiating, throbbing, dull ache and pressure    Hand dominance: left    Treatments  Current treatment: medication and physical therapy        Objective     Tenderness   Cervical Spine   No tenderness in the spinous process.     Neurological Testing     Sensation   Cervical/Thoracic   Left   Intact: light touch  Diminished: light touch    Right   Intact: light touch    Comments   Left light touch: Diminished 2-4 fingers.     Active Range of Motion  "  Cervical/Thoracic Spine       Cervical    Flexion:  WFL  Extension: 38 degrees      Left lateral flexion:  Restriction level: minimal  Right lateral flexion:  with pain Restriction level moderate  Left rotation:  Restriction level: minimal  Right rotation:  Restriction level: minimal    Joint Play     Hypomobile: C4, C5, C6 and C7     Strength/Myotome Testing   Cervical Spine     Left   Normal strength    Right   Normal strength    Tests   Cervical     Left   Positive Spurling's Test A.     Right   Negative Spurling's Test A.     Left Shoulder   Positive ULTT3.                Precautions: HTN  POC Expiration: 9/22/24  Manuals 8/22 8/5 8/8 8/12 8/15   Cervical mobs grade III-IV, STM 5'  5' 5' 5' 5'   Manual traction/SOR 5' 5' 5' 5' 5'   PA T/S mobs 5' c LUE nerve glides 5' 5' 5' 5'           Neuro Re-Ed    8/12 8/15   Chin tucks 10x5\" 10x5\" 10x5\" 10x5\"    Chin tuck with OP        Chin tuck with extension        Chin tuck with rotation        Prone Y, T, I 2x10 ea 2x10 ea 2x10 ea  2x10ea 2x10ea                                   TherEx    8/12 8/15   UBE to improve ROM/postural awareness 10' 2.0alt 10' 1.6alt 10' 1.6 alt 10' 2.0alt 10' 2.0alt   Doorway stretch 3x15\"  3x15\"  3x15\" 3 way 3x15\"ea 3way 3x15\"ea 3way   Cane shoulder /, IR NT 10x5\"ea 10x5\" ea  10x5\"ea 10x5\"ea   TB ER        TB 90/90        Cervical SNAGs 5x15\"bilat 5x15\"bilat 5x15\" 5x15\"bilat 5x15\"bilat   UT/LS Stretch 5x15\"bilat 5x15\"bilat ea 5x15\" bilat ea  5x15\"ea bilat 5x15\"ea bilat           Instructed HEP & education        Modalities     8/12 8/15    15'MH to   bilat UB 15'MH to   bilat UB declined 15'MH to bilat UB declined             "

## 2024-08-23 ENCOUNTER — TELEPHONE (OUTPATIENT)
Age: 54
End: 2024-08-23

## 2024-08-23 DIAGNOSIS — M54.12 CERVICAL RADICULOPATHY: Primary | ICD-10-CM

## 2024-08-23 NOTE — TELEPHONE ENCOUNTER
S/w pt and advised of same. Pt verbalized understanding. Provided CS# and advised pt he will need to complete 3 addit wks of PT. Pt verbalized understanding.

## 2024-08-23 NOTE — TELEPHONE ENCOUNTER
Caller: evelin    Doctor: sincere    Reason for call: pt wants to know if he can schedule his mri now?    Call back#: [phone]

## 2024-08-23 NOTE — TELEPHONE ENCOUNTER
Pt has compl 3 wks of PT for a total of 6 sessions over the 3wks. Pls advise if pt should cont for addit 3wks. Thank you!

## 2024-08-26 ENCOUNTER — OFFICE VISIT (OUTPATIENT)
Dept: PHYSICAL THERAPY | Facility: CLINIC | Age: 54
End: 2024-08-26
Payer: COMMERCIAL

## 2024-08-26 DIAGNOSIS — M54.12 CERVICAL RADICULOPATHY: Primary | ICD-10-CM

## 2024-08-26 PROCEDURE — 97112 NEUROMUSCULAR REEDUCATION: CPT

## 2024-08-26 PROCEDURE — 97140 MANUAL THERAPY 1/> REGIONS: CPT

## 2024-08-26 PROCEDURE — 97110 THERAPEUTIC EXERCISES: CPT

## 2024-08-26 NOTE — PROGRESS NOTES
"Daily Note     Today's date: 2024  Patient name: Moise Flynn  : 1970  MRN: [id number]  Referring provider: Bhavesh Jha MD  Dx:   Encounter Diagnosis     ICD-10-CM    1. Cervical radiculopathy  M54.12                      Subjective:  Patient reports that he feels tight mostly.        Objective: See treatment diary below      Assessment: Patient tolerated treatment well. Patient performed ex and received manual therapy as noted.  Patient performed ex as noted without adverse response.  + mid thoracic hypomobility noted.  Patient noted improved mobility post treatment.  Patient would benefit from continued PT intervention to address deficits and attain set goals.       Plan: Continue per plan of care.      Precautions: HTN  POC Expiration: 24  Manuals 8/22 8/26 8/8 8/12 8/15   Cervical mobs grade III-IV, STM 5'  5' 5' 5' 5'   Manual traction/SOR 5' 5' 5' 5' 5'   PA T/S mobs 5' c LUE nerve glides T/S glides  5' 5' 5' 5'           Neuro Re-Ed    8/12 8/15   Chin tucks 10x5\" 10x5\" 10x5\" 10x5\"    Chin tuck with OP        Chin tuck with extension        Chin tuck with rotation        Prone Y, T, I 2x10 ea 2x10 ea 2x10 ea  2x10ea 2x10ea                                   TherEx    8/12 8/15   UBE to improve ROM/postural awareness 10' 2.0alt 10' 3.0 alt 10' 1.6 alt 10' 2.0alt 10' 2.0alt   Doorway stretch 3x15\"  3x15\"  3 way 3x15\" 3 way 3x15\"ea 3way 3x15\"ea 3way   Cane shoulder /, IR NT 10x5\"ea 10x5\" ea  10x5\"ea 10x5\"ea   TB ER        TB 90/90        Cervical SNAGs 5x15\"bilat 5x15\"bilat 5x15\" 5x15\"bilat 5x15\"bilat   UT/LS Stretch 5x15\"bilat 5x15\"bilat ea 5x15\" bilat ea  5x15\"ea bilat 5x15\"ea bilat           Instructed HEP & education        Modalities     8/12 8/15    15'MH to   bilat UB 15'MH to   bilat UB declined 15'MH to bilat UB declined             "

## 2024-08-29 ENCOUNTER — OFFICE VISIT (OUTPATIENT)
Dept: PHYSICAL THERAPY | Facility: CLINIC | Age: 54
End: 2024-08-29
Payer: COMMERCIAL

## 2024-08-29 DIAGNOSIS — M54.12 CERVICAL RADICULOPATHY: Primary | ICD-10-CM

## 2024-08-29 PROCEDURE — 97140 MANUAL THERAPY 1/> REGIONS: CPT

## 2024-08-29 PROCEDURE — 97110 THERAPEUTIC EXERCISES: CPT

## 2024-08-29 NOTE — PROGRESS NOTES
"Daily Note     Today's date: 2024  Patient name: Moise Flynn  : 1970  MRN: [id number]  Referring provider: Bhavesh Jha MD  Dx:   Encounter Diagnosis     ICD-10-CM    1. Cervical radiculopathy  M54.12                      Subjective: Pt notes he is feeling pretty good today      Objective: See treatment diary below      Assessment:  Pt tolerated treatment session fairly well. No reproduction of radicular symptoms, vertebral mobility improving in cervical spine overall. Pt would benefit from continued OP PT services in order to address ongoing impairments and improve functional activity limitations.      Plan: Continue per plan of care.      Precautions: HTN  POC Expiration: 24  Manuals 8/22 8/26 8/29 8/12 8/15   Cervical mobs grade III-IV, STM 5'  5' 5' 5' 5'   Manual traction/SOR 5' 5' 5' 5' 5'   PA T/S mobs 5' c LUE nerve glides T/S glides  5' 5' 5' 5'           Neuro Re-Ed    8/12 8/15   Chin tucks 10x5\" 10x5\"  10x5\"    Chin tuck with OP        Chin tuck with extension        Chin tuck with rotation        Prone Y, T, I 2x10 ea 2x10 ea 2x10 ea  2x10ea 2x10ea                                   TherEx    8/12 8/15   UBE to improve ROM/postural awareness 10' 2.0alt 10' 3.0 alt 10' 1.6 alt 10' 2.0alt 10' 2.0alt   Doorway stretch 3x15\"  3x15\"  3 way 3x15\" 3 way 3x15\"ea 3way 3x15\"ea 3way   Cane shoulder /, IR NT 10x5\"ea 10x5\" ea  10x5\"ea 10x5\"ea   TB ER        TB 90/90        Cervical SNAGs 5x15\"bilat 5x15\"bilat 5x15\" 5x15\"bilat 5x15\"bilat   UT/LS Stretch 5x15\"bilat 5x15\"bilat ea 5x15\" bilat ea  5x15\"ea bilat 5x15\"ea bilat           Instructed HEP & education        Modalities     8/12 8/15    15'MH to   bilat UB 15'MH to   bilat UB declined 15'MH to bilat UB declined               "

## 2024-08-30 NOTE — PROGRESS NOTES
"Daily Note     Today's date: 9/3/2024  Patient name: Moise Flynn  : 1970  MRN: [id number]  Referring provider: Bhavesh Jha MD  Dx:   Encounter Diagnosis     ICD-10-CM    1. Cervical radiculopathy  M54.12                      Subjective: Patient reports to PT ready and enthusiastic to begin his program today.       Objective: See treatment diary below      Assessment: Tolerated treatment well. Patient exhibited good technique with therapeutic exercises and would benefit from continued PT to increase cervical ROM/strength and endurance to improve his mobility.      Plan: Continue per plan of care.      Precautions: HTN  POC Expiration: 24  Manuals 8/22 8/26 8/29 9/3    Cervical mobs grade III-IV, STM 5'  5' 5' 5'    Manual traction/SOR 5' 5' 5' 5'    PA T/S mobs 5' c LUE nerve glides T/S glides  5' 5' 5'            Neuro Re-Ed    9/3    Chin tucks 10x5\" 10x5\"      Chin tuck with OP        Chin tuck with extension        Chin tuck with rotation        Prone Y, T, I 2x10 ea 2x10 ea 2x10 ea  2x10ea                                    TherEx    9/3    UBE to improve ROM/postural awareness 10' 2.0alt 10' 3.0 alt 10' 1.6 alt 10' 1.6alt    Doorway stretch 3x15\"  3x15\"  3 way 3x15\" 3 way 3x15\"ea     Cane shoulder /, IR NT 10x5\"ea 10x5\" ea      TB ER        TB 90/90        Cervical SNAGs 5x15\"bilat 5x15\"bilat 5x15\"     UT/LS Stretch 5x15\"bilat 5x15\"bilat ea 5x15\" bilat ea  5x15\"bilat UT            Instructed HEP & education        Modalities     9/3     15'MH to   bilat UB 15'MH to   bilat UB declined 10'MH to Bilat UB, Lshld                  "

## 2024-09-03 ENCOUNTER — OFFICE VISIT (OUTPATIENT)
Dept: PHYSICAL THERAPY | Facility: CLINIC | Age: 54
End: 2024-09-03

## 2024-09-03 DIAGNOSIS — M54.12 CERVICAL RADICULOPATHY: Primary | ICD-10-CM

## 2024-09-03 PROCEDURE — 97110 THERAPEUTIC EXERCISES: CPT

## 2024-09-03 PROCEDURE — 97140 MANUAL THERAPY 1/> REGIONS: CPT

## 2024-09-05 ENCOUNTER — APPOINTMENT (OUTPATIENT)
Dept: PHYSICAL THERAPY | Facility: CLINIC | Age: 54
End: 2024-09-05

## 2024-09-05 NOTE — PROGRESS NOTES
"Daily Note     Today's date: 2024  Patient name: Moise Flynn  : 1970  MRN: [id number]  Referring provider: Bhavesh Jha MD  Dx:   Encounter Diagnosis     ICD-10-CM    1. Cervical radiculopathy  M54.12                      Subjective: ***      Objective: See treatment diary below      Assessment: Tolerated treatment {Tolerated treatment :[id number]}. Patient exhibited good technique with therapeutic exercises and would benefit from continued PT      Plan: Continue per plan of care.      Precautions: HTN  POC Expiration: 24  Manuals 8/22 8/26 8/29 9/3    Cervical mobs grade III-IV, STM 5'  5' 5' 5'    Manual traction/SOR 5' 5' 5' 5'    PA T/S mobs 5' c LUE nerve glides T/S glides  5' 5' 5'            Neuro Re-Ed    9/3    Chin tucks 10x5\" 10x5\"      Chin tuck with OP        Chin tuck with extension        Chin tuck with rotation        Prone Y, T, I 2x10 ea 2x10 ea 2x10 ea  2x10ea                                    TherEx    9/3    UBE to improve ROM/postural awareness 10' 2.0alt 10' 3.0 alt 10' 1.6 alt 10' 1.6alt    Doorway stretch 3x15\"  3x15\"  3 way 3x15\" 3 way 3x15\"ea     Cane shoulder /, IR NT 10x5\"ea 10x5\" ea      TB ER        TB 90/90        Cervical SNAGs 5x15\"bilat 5x15\"bilat 5x15\"     UT/LS Stretch 5x15\"bilat 5x15\"bilat ea 5x15\" bilat ea  5x15\"bilat UT            Instructed HEP & education        Modalities     9/3     15'MH to   bilat UB 15'MH to   bilat UB declined 10'MH to Bilat UB, Lshld                    "

## 2024-09-09 ENCOUNTER — OFFICE VISIT (OUTPATIENT)
Dept: PHYSICAL THERAPY | Facility: CLINIC | Age: 54
End: 2024-09-09

## 2024-09-09 DIAGNOSIS — M54.12 CERVICAL RADICULOPATHY: Primary | ICD-10-CM

## 2024-09-09 PROCEDURE — 97110 THERAPEUTIC EXERCISES: CPT

## 2024-09-09 PROCEDURE — 97140 MANUAL THERAPY 1/> REGIONS: CPT

## 2024-09-09 NOTE — PROGRESS NOTES
"Daily Note     Today's date: 2024  Patient name: Moise Flynn  : 1970  MRN: [id number]  Referring provider: Bhavesh Jha MD  Dx:   Encounter Diagnosis     ICD-10-CM    1. Cervical radiculopathy  M54.12                      Subjective: Patient reports to PT ready and enthusiastic to begin his program this afternoon.      Objective: See treatment diary below      Assessment: Tolerated treatment well. Patient exhibited good technique with therapeutic exercises and would benefit from continued PT to increase cervical ROM/strength and endurance to improve his mobility.      Plan: Continue per plan of care.      Precautions: HTN  POC Expiration: 24  Manuals 8/22 8/26 8/29 9/3 9/9   Cervical mobs grade III-IV, STM 5'  5' 5' 5' 5'   Manual traction/SOR 5' 5' 5' 5' 5'   PA T/S mobs 5' c LUE nerve glides T/S glides  5' 5' 5' 5'           Neuro Re-Ed    9/3 9/9   Chin tucks 10x5\" 10x5\"      Chin tuck with OP        Chin tuck with extension        Chin tuck with rotation        Prone Y, T, I 2x10 ea 2x10 ea 2x10 ea  2x10ea 2x10ea                                   TherEx    9/3 9/9   UBE to improve ROM/postural awareness 10' 2.0alt 10' 3.0 alt 10' 1.6 alt 10' 1.6alt 10' 2.0alt   Doorway stretch 3x15\"  3x15\"  3 way 3x15\" 3 way 3x15\"ea  3x15\"ea   Cane shoulder /, IR NT 10x5\"ea 10x5\" ea   10x5\"ea   TB ER        TB 90/90        Cervical SNAGs 5x15\"bilat 5x15\"bilat 5x15\"     UT/LS Stretch 5x15\"bilat 5x15\"bilat ea 5x15\" bilat ea  5x15\"bilat UT 5x15\"bilat UT           Instructed HEP & education        Modalities     9/3 9/9    15'MH to   bilat UB 15'MH to   bilat UB declined 10'MH to Bilat UB, [place]sh[place] 15'MH to bilat UB, Lshld                   "

## 2024-09-11 ENCOUNTER — OFFICE VISIT (OUTPATIENT)
Dept: PHYSICAL THERAPY | Facility: CLINIC | Age: 54
End: 2024-09-11

## 2024-09-11 DIAGNOSIS — M54.12 CERVICAL RADICULOPATHY: Primary | ICD-10-CM

## 2024-09-11 PROCEDURE — 97110 THERAPEUTIC EXERCISES: CPT | Performed by: PHYSICAL THERAPIST

## 2024-09-11 PROCEDURE — 97140 MANUAL THERAPY 1/> REGIONS: CPT | Performed by: PHYSICAL THERAPIST

## 2024-09-11 NOTE — PROGRESS NOTES
"Daily Note     Today's date: 2024  Patient name: Moise Flynn  : 1970  MRN: [id number]  Referring provider: Bhavesh Jha MD  Dx:   Encounter Diagnosis     ICD-10-CM    1. Cervical radiculopathy  M54.12                      Subjective: Pt reports he is feeling good and sleeping better at night      Objective: See treatment diary below      Assessment:  Pt tolerated treatment session well. Responding well to MT and stretching exercises. Overall progressing towards goals. Pt would benefit from continued OP PT services in order to address ongoing impairments and improve functional activity limitations.      Plan: Continue per plan of care.      Precautions: HTN  POC Expiration: 24  Manuals 8/26 8/29 9/3 9/9 9/11   Cervical mobs grade III-IV, STM 5' 5' 5' 5' 5'   Manual traction/SOR 5' 5' 5' 5' 5'   PA T/S mobs T/S glides  5' 5' 5' 5' 5'           Neuro Re-Ed   9/3 9/9    Chin tucks 10x5\"       Chin tuck with OP        Chin tuck with extension        Chin tuck with rotation        Prone Y, T, I 2x10 ea 2x10 ea  2x10ea 2x10ea                                    TherEx   9/3 9/9    UBE to improve ROM/postural awareness 10' 3.0 alt 10' 1.6 alt 10' 1.6alt 10' 2.0alt 10' 2.0alt   Doorway stretch 3x15\"  3 way 3x15\" 3 way 3x15\"ea  3x15\"ea 3x15\"ea   Cane shoulder /, IR 10x5\"ea 10x5\" ea   10x5\"ea 10x5\"ea   TB ER        TB 90/90        Cervical SNAGs 5x15\"bilat 5x15\"      UT/LS Stretch 5x15\"bilat ea 5x15\" bilat ea  5x15\"bilat UT 5x15\"bilat UT 5x15\"bilat UT           Instructed HEP & education        Modalities    9/3 9/9     15'MH to   bilat UB declined 10'MH to Bilat UB, [place]shld 15'MH to bilat UB, [place]shld 15'MH to bilat UB, Lshld                     "

## 2024-09-17 ENCOUNTER — HOSPITAL ENCOUNTER (OUTPATIENT)
Dept: MRI IMAGING | Facility: HOSPITAL | Age: 54
Discharge: HOME | End: 2024-09-17
Attending: ANESTHESIOLOGY
Payer: COMMERCIAL

## 2024-09-17 DIAGNOSIS — M54.12 CERVICAL RADICULOPATHY: ICD-10-CM

## 2024-09-17 PROCEDURE — 72141 MRI NECK SPINE W/O DYE: CPT

## 2024-09-18 ENCOUNTER — APPOINTMENT (OUTPATIENT)
Dept: PHYSICAL THERAPY | Facility: CLINIC | Age: 54
End: 2024-09-18

## 2024-09-18 ENCOUNTER — TELEPHONE (OUTPATIENT)
Dept: PAIN MEDICINE | Facility: CLINIC | Age: 54
End: 2024-09-18

## 2024-09-18 NOTE — PROGRESS NOTES
"Daily Note     Today's date: 2024  Patient name: Moise Flynn  : 1970  MRN: [id number]  Referring provider: Bhavesh Jha MD  Dx:   Encounter Diagnosis     ICD-10-CM    1. Cervical radiculopathy  M54.12                      Subjective:  Patient reports continuation of overall improvement since the start of therapy.  Patient reports symptoms have improved at night since the start of therapy but states continuation of flare ups in the neck, UE and left UE.        Objective: See treatment diary below      Assessment: Tolerated treatment well.  PT notes continuation of decrease ROM and strength t/o the cervical spine and UB with demonstration of impaired UB posture with need for continuation of skilled therapy.  PT notes the patient did receive an MRI of the cervical spine with findings of 2 segments of disc bulge herniation so pain management MD has ordered a series of analgesic injections in the cervical spine on 10/1/24.         Plan: Progress note during next visit.      Precautions: HTN  POC Expiration: 24  Manuals 9/3 9/9 9/11 9/19   Cervical mobs grade III-IV, STM 5' 5' 5' 5 min    Manual traction/SOR 5' 5' 5' 5 min    PA T/S mobs 5' 5' 5' 5 min           Neuro Re-Ed 9/3 9/9     Chin tucks       Chin tuck with OP       Chin tuck with extension       Chin tuck with rotation       Prone Y, T, I 2x10ea 2x10ea  2x10 Each                                TherEx 9/3 9/9     UBE to improve ROM/postural awareness 10' 1.6alt 10' 2.0alt 10' 2.0alt 10 min L2.0 Alt    Doorway stretch 3x15\"ea  3x15\"ea 3x15\"ea 3x15\" Hold Each    Cane shoulder /, IR  10x5\"ea 10x5\"ea 10x5\" Hold Each    TB ER       TB 90/90       Cervical SNAGs       UT/LS Stretch 5x15\"bilat UT 5x15\"bilat UT 5x15\"bilat UT 5x15\" Hold Bilat           Instructed HEP & education       Modalities  9/3 9/9      10'MH to Bilat UB, Lshld 15'MH to bilat UB, Lshld 15'MH to bilat UB, Lshld 15 min MHP Bilat UB                       "

## 2024-09-18 NOTE — TELEPHONE ENCOUNTER
C5-C6: Minimal disc bulge. There is right subarticular/foraminal disc protrusion. Mild uncovertebral spurring and facet arthropathy. No significant canal stenosis. There is moderate right and mild left foraminal stenosis.     C6-C7: Disc bulge. Bilateral uncovertebral spurring. There is mild canal stenosis. Moderate to severe right and mild left foraminal stenosis.     C7-T1: Disc bulge. Bilateral uncovertebral spurring. There is mild canal stenosis. There is severe right foraminal stenosis.    Above MRI findings discussed with patient via phone message with call back number left for questions; will plan for ILESI at C7-T1 or alt level to target radicular pain; informed consent to be obtained day of procedure.

## 2024-09-18 NOTE — PROGRESS NOTES
"Daily Note     Today's date: 2024  Patient name: Moise Flynn  : 1970  MRN: [id number]  Referring provider: Bhavesh Jha MD  Dx:   Encounter Diagnosis     ICD-10-CM    1. Cervical radiculopathy  M54.12                      Subjective: ***      Objective: See treatment diary below      Assessment: Tolerated treatment {Tolerated treatment :[id number]}. Patient exhibited good technique with therapeutic exercises and would benefit from continued PT to increase cervical ROM/strength and endurance to improve his mobility.      Plan: Continue per plan of care.      Precautions: HTN  POC Expiration: 24  Manuals 8/29 9/3 9/9 9/11 9/18   Cervical mobs grade III-IV, STM 5' 5' 5' 5'    Manual traction/SOR 5' 5' 5' 5'    PA T/S mobs 5' 5' 5' 5'            Neuro Re-Ed  9/3 9/9  9/18   Chin tucks        Chin tuck with OP        Chin tuck with extension        Chin tuck with rotation        Prone Y, T, I 2x10 ea  2x10ea 2x10ea                                     TherEx  9/3 9/9  9/18   UBE to improve ROM/postural awareness 10' 1.6 alt 10' 1.6alt 10' 2.0alt 10' 2.0alt    Doorway stretch 3x15\" 3 way 3x15\"ea  3x15\"ea 3x15\"ea    Cane shoulder /, IR 10x5\" ea   10x5\"ea 10x5\"ea    TB ER        TB 90/90        Cervical SNAGs 5x15\"       UT/LS Stretch 5x15\" bilat ea  5x15\"bilat UT 5x15\"bilat UT 5x15\"bilat UT            Instructed HEP & education        Modalities   9/3 9/9  9/18    declined 10'MH to Bilat UB, Lshld 15'MH to bilat UB, Lshld 15'MH to bilat UB, Lshld                        "

## 2024-09-19 ENCOUNTER — PREP FOR PROCEDURE (OUTPATIENT)
Dept: PAIN MEDICINE | Facility: CLINIC | Age: 54
End: 2024-09-19

## 2024-09-19 ENCOUNTER — OFFICE VISIT (OUTPATIENT)
Dept: PHYSICAL THERAPY | Facility: CLINIC | Age: 54
End: 2024-09-19

## 2024-09-19 DIAGNOSIS — M54.12 CERVICAL RADICULOPATHY: Primary | ICD-10-CM

## 2024-09-19 PROCEDURE — 97110 THERAPEUTIC EXERCISES: CPT | Performed by: PHYSICAL THERAPIST

## 2024-09-19 PROCEDURE — 97140 MANUAL THERAPY 1/> REGIONS: CPT | Performed by: PHYSICAL THERAPIST

## 2024-09-20 NOTE — TELEPHONE ENCOUNTER
Called patient, left voicemail to let him know that he does not need to hold his Jardiance prior to his procedure since he is not getting IV SEDATION.

## 2024-09-23 ENCOUNTER — EVALUATION (OUTPATIENT)
Dept: PHYSICAL THERAPY | Facility: CLINIC | Age: 54
End: 2024-09-23

## 2024-09-23 DIAGNOSIS — M54.12 CERVICAL RADICULOPATHY: Primary | ICD-10-CM

## 2024-09-23 PROCEDURE — 97110 THERAPEUTIC EXERCISES: CPT

## 2024-09-23 PROCEDURE — 97140 MANUAL THERAPY 1/> REGIONS: CPT

## 2024-09-23 NOTE — PROGRESS NOTES
"Daily Note     Today's date: 2024  Patient name: Moise Flynn  : 1970  MRN: [id number]  Referring provider: Bhavesh Jha MD  Dx:   Encounter Diagnosis     ICD-10-CM    1. Cervical radiculopathy  M54.12                      Subjective: Patient reports that he is pleased with his progress.  \"Its only positions I have to stay in for a few minutes or more that really bother me.  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 Patient exhibited good technique with therapeutic exercises and would benefit from continued PT to increase cervical ROM/strength and endurance to improve mobility.      Plan: Continue per plan of care.      Precautions: HTN  POC Expiration: 24  Manuals 9/3 9/9 9/11 9/19 9/23   Cervical mobs grade III-IV, STM 5' 5' 5' 5 min  5'   Manual traction/SOR 5' 5' 5' 5 min  5'   PA T/S mobs 5' 5' 5' 5 min  5'           Neuro Re-Ed 9/3 9/9   9/23   Chin tucks        Chin tuck with OP        Chin tuck with extension        Chin tuck with rotation        Prone Y, T, I 2x10ea 2x10ea  2x10 Each  2x10ea                                   TherEx 9/3 9/9   9/23   UBE to improve ROM/postural awareness 10' 1.6alt 10' 2.0alt 10' 2.0alt 10 min L2.0 Alt  10' 2.0alt    Doorway stretch 3x15\"ea  3x15\"ea 3x15\"ea 3x15\" Hold Each  3x15\"ea   Cane shoulder /, IR  10x5\"ea 10x5\"ea 10x5\" Hold Each     TB ER        TB 90/90        Cervical SNAGs        UT/LS Stretch 5x15\"bilat UT 5x15\"bilat UT 5x15\"bilat UT 5x15\" Hold Bilat  3x15\"bilat           Instructed HEP & education        Modalities  9/3 9/9   9/23    10'MH to Bilat UB, Lshld 15'MH to bilat UB, Lshld 15'MH to bilat UB, Lshld 15 min MHP Bilat UB 15'MH to bilat UB                         "

## 2024-09-23 NOTE — PROGRESS NOTES
PT Re-Evaluation     Today's date: 2024  Patient name: Moise Flynn  : 1970  MRN: [id number]  Referring provider: Bhavesh Jha MD  Dx:   Encounter Diagnosis     ICD-10-CM    1. Cervical radiculopathy  M54.12                        Assessment  Impairments: activity intolerance, lacks appropriate home exercise program, pain with function, unable to perform ADL, activity limitations and endurance  Symptom irritability: low    Assessment details: Pt has attended a total of 14 PT sessions and has made fairly good progress towards goals. He has made progress regarding pain levels, cervical ROM, cervical mobility and UE strength. Pt is scheduled for C7/T1 ILESI on 10/1/24 with PM. He has been sleeping better and notes that he is able to do a little more before his symptoms begin. Pt will transition to HEP next visit. Thank you!            Goals  STG (to be met within 4 weeks):  1.  Pt will reports no more than 4/10 pain at worst in order to improve function   met  2. Pt will improve cervical flexion and extension ROM by at least 5* in order to improve cervical mobility  met  3. Pt will improve cervical R& L lateral flexion ROM to next least restrictive level in order to improve cervical mobility  met  4. Pt will improve cervical R& L rotation ROM to next least restrictive level in order to improve cervical mobility  met  5. Pt will improve cervical vertebrae mobility to WFL in order to improve posture  met  6. Pt will improve FOTO score by at least 5 points in order to progress to prior level of function met    LTG (to be met in 8 weeks):  1. Pt will report no more than 2/10 pain at worst in order to complete ADLs  progressing  2. Pt will be able to tolerate work related activities without radicular symptoms in order to improve function  met  3. Pt will be able to perform ADLs and hobbies without pain in order to return to PLOF  met  4. Pt will achieve FOTO discharge in order to improve QOL   met  5. Pt  to be independent with HEP   met    Plan  Patient would benefit from: skilled physical therapy  Planned modality interventions: thermotherapy: hydrocollator packs and cryotherapy    Planned therapy interventions: joint mobilization, manual therapy, neuromuscular re-education, patient education, strengthening, stretching, therapeutic exercise, home exercise program and balance    Frequency: 2x week  Duration in weeks: 2  Treatment plan discussed with: patient  Plan details: PT discussed goals and expectations of OPPT; pt understanding of POC.      Subjective Evaluation    History of Present Illness  Mechanism of injury: Pt reports improvements overall. He notes that his symptoms are primarily only present if he is doing something for a prolonged period of time  Patient Goals  Patient goals for therapy: increased strength, independence with ADLs/IADLs, return to sport/leisure activities, increased motion, improved balance and decreased pain    Pain  Current pain ratin  At best pain ratin  At worst pain ratin  Location: Entire LUE  Quality: dull ache and pressure    Hand dominance: left    Treatments  Current treatment: medication and physical therapy        Objective     Tenderness   Cervical Spine   No tenderness in the spinous process.     Neurological Testing     Sensation   Cervical/Thoracic   Left   Intact: light touch    Right   Intact: light touch    Active Range of Motion   Cervical/Thoracic Spine       Cervical    Flexion:  WFL  Extension:  WFL  Left lateral flexion:  Restriction level: minimal  Right lateral flexion:  Restriction level minimal  Left rotation:  WFL  Right rotation:  WFL    Joint Play     Hypomobile: C4, C5, C6 and C7     Strength/Myotome Testing   Cervical Spine     Left   Normal strength    Right   Normal strength    Tests   Cervical     Left   Negative Spurling's Test A.     Right   Negative Spurling's Test A.     Left Shoulder   Negative ULTT3.                Precautions:  HTN  POC Expiration: 9/22/24

## 2024-09-26 ENCOUNTER — OFFICE VISIT (OUTPATIENT)
Dept: PHYSICAL THERAPY | Facility: CLINIC | Age: 54
End: 2024-09-26

## 2024-09-26 DIAGNOSIS — M54.12 CERVICAL RADICULOPATHY: Primary | ICD-10-CM

## 2024-09-26 PROCEDURE — 97140 MANUAL THERAPY 1/> REGIONS: CPT

## 2024-09-26 PROCEDURE — 97110 THERAPEUTIC EXERCISES: CPT

## 2024-09-26 NOTE — PROGRESS NOTES
"Daily Note     Today's date: 2024  Patient name: Moise Flynn  : 1970  MRN: [id number]  Referring provider: Bhavesh Jha MD  Dx:   Encounter Diagnosis     ICD-10-CM    1. Cervical radiculopathy  M54.12           Start Time: 1609  Stop Time: 1655  Total time in clinic (min): 46 minutes    Subjective: Pt denies any change in status upon arrival.       Objective: See treatment diary below      Assessment: Tolerated treatment well. Patient was educated on HEP including answered questions to correct form for outside of clinic. Pt was DC to HEP this visit. He was advised to reach out for any further physical therapy needs. Pt had no fatigue nor pain upon departure.       Plan: Pt was DC to HEP.      Precautions: HTN  POC Expiration: 10/19/2024      Manuals 9/3 9/9 9/11 9/19 9/23 9/26   Cervical mobs grade III-IV, STM 5' 5' 5' 5 min  5'    Manual traction/SOR 5' 5' 5' 5 min  5' 5'   PA T/S mobs 5' 5' 5' 5 min  5'                   Neuro Re-Ed 9/3 9/9     9/23 9/26   Chin tucks              Chin tuck with OP              Chin tuck with extension              Chin tuck with rotation              Prone Y, T, I 2x10ea 2x10ea   2x10 Each  2x10ea 2x10ea                                                               TherEx 9/3 9/9     9/23 9/26   UBE to improve ROM/postural awareness 10' 1.6alt 10' 2.0alt 10' 2.0alt 10 min L2.0 Alt  10' 2.0alt  10' 2.0alt    Doorway stretch 3x15\"ea  3x15\"ea 3x15\"ea 3x15\" Hold Each  3x15\"ea 3x15\"ea   Cane shoulder /, IR   10x5\"ea 10x5\"ea 10x5\" Hold Each       TB ER              TB 90/90              Cervical SNAGs              UT/LS Stretch 5x15\"bilat UT 5x15\"bilat UT 5x15\"bilat UT 5x15\" Hold Bilat  3x15\"bilat 3x15\"bilat                  Instructed HEP & education              Modalities  9/3 9/9     9/23      10'MH to Bilat UB, Lshld 15'MH to bilat UB, Lshld 15'MH to bilat UB, Lshld 15 min MHP Bilat UB 15'MH to bilat UB            "

## 2024-09-27 NOTE — PROGRESS NOTES
PT Discharge    Today's date: 2024  Patient name: Moise Flynn  : 1970  MRN: [id number]  Referring provider: Bhavesh Jha MD  Dx:   Encounter Diagnosis     ICD-10-CM    1. Cervical radiculopathy  M54.12             Start Time: 1609  Stop Time: 1655  Total time in clinic (min): 46 minutes    Assessment    Assessment details: Pt has attended a total of 16 PT sessions and has made adequate progress towards goals to be d/c from PT services. PT reviewed and instructed HEP (handout provided) along with answering pt questions regarding current functional status. Pt has no concerns at time of discharge. Thank you!      Goals  STG (to be met within 4 weeks):  1.  Pt will reports no more than 4/10 pain at worst in order to improve function   met  2. Pt will improve cervical flexion and extension ROM by at least 5* in order to improve cervical mobility  met  3. Pt will improve cervical R& L lateral flexion ROM to next least restrictive level in order to improve cervical mobility  met  4. Pt will improve cervical R& L rotation ROM to next least restrictive level in order to improve cervical mobility  met  5. Pt will improve cervical vertebrae mobility to WFL in order to improve posture  met  6. Pt will improve FOTO score by at least 5 points in order to progress to prior level of function met    LTG (to be met in 8 weeks):  1. Pt will report no more than 2/10 pain at worst in order to complete ADLs  progressing  2. Pt will be able to tolerate work related activities without radicular symptoms in order to improve function  met  3. Pt will be able to perform ADLs and hobbies without pain in order to return to PLOF  met  4. Pt will achieve FOTO discharge in order to improve QOL   met  5. Pt to be independent with HEP   met    Plan    Treatment plan discussed with: patient      Subjective Evaluation    History of Present Illness  Mechanism of injury: Pt feels prepared to transition to HEP at this  time  Pain  Current pain ratin  At best pain ratin  At worst pain ratin  Location: Entire LUE  Quality: dull ache and pressure    Hand dominance: left          Objective     Tenderness   Cervical Spine   No tenderness in the spinous process.     Neurological Testing     Sensation   Cervical/Thoracic   Left   Intact: light touch    Right   Intact: light touch    Active Range of Motion   Cervical/Thoracic Spine       Cervical    Flexion:  WFL  Extension:  WFL  Left lateral flexion:  Restriction level: minimal  Right lateral flexion:  Restriction level minimal  Left rotation:  WFL  Right rotation:  WFL    Joint Play     Hypomobile: C4, C5, C6 and C7     Strength/Myotome Testing   Cervical Spine     Left   Normal strength    Right   Normal strength    Tests   Cervical     Left   Negative Spurling's Test A.     Right   Negative Spurling's Test A.     Left Shoulder   Negative ULTT3.                Precautions: HTN  POC Expiration: 24

## 2024-10-01 ENCOUNTER — HOSPITAL ENCOUNTER (OUTPATIENT)
Dept: INTERVENTIONAL RADIOLOGY/VASCULAR | Facility: HOSPITAL | Age: 54
Discharge: HOME | End: 2024-10-01
Attending: ANESTHESIOLOGY | Admitting: ANESTHESIOLOGY
Payer: COMMERCIAL

## 2024-10-01 VITALS
HEIGHT: 69 IN | RESPIRATION RATE: 20 BRPM | TEMPERATURE: 97 F | BODY MASS INDEX: 33.77 KG/M2 | DIASTOLIC BLOOD PRESSURE: 55 MMHG | OXYGEN SATURATION: 94 % | WEIGHT: 228 LBS | SYSTOLIC BLOOD PRESSURE: 103 MMHG | HEART RATE: 91 BPM

## 2024-10-01 DIAGNOSIS — M54.12 CERVICAL RADICULOPATHY: ICD-10-CM

## 2024-10-01 LAB — GLUCOSE SERPL-MCNC: 211 MG/DL (ref 65–140)

## 2024-10-01 PROCEDURE — 62321 NJX INTERLAMINAR CRV/THRC: CPT | Performed by: ANESTHESIOLOGY

## 2024-10-01 PROCEDURE — 82948 REAGENT STRIP/BLOOD GLUCOSE: CPT

## 2024-10-01 RX ORDER — METHYLPREDNISOLONE ACETATE 80 MG/ML
INJECTION, SUSPENSION INTRA-ARTICULAR; INTRALESIONAL; INTRAMUSCULAR; PARENTERAL; SOFT TISSUE AS NEEDED
Status: COMPLETED | OUTPATIENT
Start: 2024-10-01 | End: 2024-10-01

## 2024-10-01 RX ORDER — 0.9 % SODIUM CHLORIDE 0.9 %
VIAL (ML) INJECTION AS NEEDED
Status: COMPLETED | OUTPATIENT
Start: 2024-10-01 | End: 2024-10-01

## 2024-10-01 RX ORDER — LIDOCAINE HYDROCHLORIDE 10 MG/ML
INJECTION, SOLUTION EPIDURAL; INFILTRATION; INTRACAUDAL; PERINEURAL AS NEEDED
Status: COMPLETED | OUTPATIENT
Start: 2024-10-01 | End: 2024-10-01

## 2024-10-01 RX ADMIN — IOHEXOL 1 ML: 240 INJECTION, SOLUTION INTRATHECAL; INTRAVASCULAR; INTRAVENOUS; ORAL at 13:49

## 2024-10-01 RX ADMIN — LIDOCAINE HYDROCHLORIDE 5 ML: 10 INJECTION, SOLUTION EPIDURAL; INFILTRATION; INTRACAUDAL; PERINEURAL at 13:48

## 2024-10-01 RX ADMIN — METHYLPREDNISOLONE ACETATE 80 MG: 80 INJECTION, SUSPENSION INTRA-ARTICULAR; INTRALESIONAL; INTRAMUSCULAR; SOFT TISSUE at 13:50

## 2024-10-01 RX ADMIN — SODIUM CHLORIDE 3 ML: 9 INJECTION, SOLUTION INTRAMUSCULAR; INTRAVENOUS; SUBCUTANEOUS at 13:50
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
## 2024-10-01 NOTE — DISCHARGE INSTR - AVS FIRST PAGE
YOUR 2 WEEK FOLLOW UP HAS BEEN SCHEDULED; IF YOU WISH TO CHANGE THE FOLLOW UP, PLEASE CALL THE [facility] [place]: [phone]    Epidural Steroid Injection   WHAT YOU NEED TO KNOW:   An epidural steroid injection (UMER) is a procedure to inject steroid medicine into the epidural space. The epidural space is between your spinal cord and vertebrae. Steroids reduce inflammation and fluid buildup in your spine that may be causing pain. You may be given pain medicine along with the steroids.        DISCHARGE INSTRUCTIONS:   Call your local emergency number (911 in the [place]) if:   You have a seizure.    You have trouble moving your legs.    Seek care immediately if:   Blood soaks through your bandage.    You have a fever or chills, severe back pain, and the procedure area is sensitive to the touch.    You cannot control when you urinate or have a bowel movement.    Call your doctor if:   You have weakness or numbness in your legs.    Your wound is red, swollen, or draining pus.    You have nausea or are vomiting.    Your face or neck is red and you feel warm.    You have more pain than you had before the procedure.    You have swelling in your hands or feet.    You have questions or concerns about your condition or care.    Care for your wound as directed:  You may remove the bandage before you go to bed the day of your procedure. You may take a shower, but do not take a bath for at least 24 hours.   Self-care:   Do not drive,  use machines, or do strenuous activity for 24 hours after your procedure or as directed.     Continue other treatments  as directed. Steroid injections alone will not control your pain. The injections are meant to be used with other treatments, such as physical therapy.    Follow up with your doctor as directed:  Write down your questions so you remember to ask them during your visits.     EPIDURAL STEROID INJECTION DISCHARGE INSTRUCTIONS      ACTIVITY  Do not drive or operate  machinery today.  No strenuous activity today - bending, lifting, etc.   You may resume normal activities starting tomorrow - start slowly and as tolerated.  You may shower today, but not tub baths or hot tubs.  You may have numbness for several hours from the local anesthetics. Please use caution and common sense, especially with weight-bearing activities.    CARE OF THE INJECTION SITE  If you have soreness or pain apply ice to the area today (20 minutes on and 20 minutes off).  Starting tomorrow, you   Notify the [facility] if you have any of the following: redness, drainage, swelling or fever above 100°F.      MEDICATIONS  Continue to take all routine medications.  Our office may have instructed you to hold some medications. You may restart medications, including blood thinners.

## 2025-06-22 ENCOUNTER — ANESTHESIA EVENT (OUTPATIENT)
Dept: ANESTHESIOLOGY | Facility: HOSPITAL | Age: 55
End: 2025-06-22

## 2025-06-22 ENCOUNTER — ANESTHESIA (OUTPATIENT)
Dept: ANESTHESIOLOGY | Facility: HOSPITAL | Age: 55
End: 2025-06-22

## 2025-06-22 NOTE — ANESTHESIA PREPROCEDURE EVALUATION
Procedure:  PRE-OP ONLY    Relevant Problems   CARDIO   (+) HTN (hypertension)      /RENAL   (+) Chronic kidney disease      Glp-1    HYPOTHYROID      Physical Exam    Airway     Mallampati score: II  TM Distance: >3 FB  Neck ROM: full  Mouth opening: >= 4 cm      Cardiovascular  Cardiovascular exam normal    Dental   No notable dental hx     Pulmonary  Pulmonary exam normal     Neurological  - normal exam    Other Findings        Anesthesia Plan  ASA Score- 3     Anesthesia Type- IV sedation with anesthesia with ASA Monitors.         Additional Monitors:     Airway Plan: natural airway.           Plan Factors-Exercise tolerance (METS): >4 METS.    Chart reviewed.  Imaging results reviewed. Existing labs reviewed. Patient summary reviewed.    Patient is not a current smoker.      Obstructive sleep apnea risk education given perioperatively.        Induction- intravenous.    Postoperative Plan- .   Monitoring Plan - Monitoring plan - standard ASA monitoring      Perioperative Resuscitation Plan - Level 1 - Full Code.       Informed Consent-       NPO Status:  No vitals data found for the desired time range.

## 2025-06-24 ENCOUNTER — ANESTHESIA (OUTPATIENT)
Dept: GASTROENTEROLOGY | Facility: HOSPITAL | Age: 55
End: 2025-06-24
Payer: COMMERCIAL

## 2025-06-24 ENCOUNTER — HOSPITAL ENCOUNTER (OUTPATIENT)
Dept: GASTROENTEROLOGY | Facility: HOSPITAL | Age: 55
Setting detail: OUTPATIENT SURGERY
Discharge: HOME | End: 2025-06-24
Attending: HOSPITALIST
Payer: COMMERCIAL

## 2025-06-24 ENCOUNTER — ANESTHESIA EVENT (OUTPATIENT)
Dept: GASTROENTEROLOGY | Facility: HOSPITAL | Age: 55
End: 2025-06-24
Payer: COMMERCIAL

## 2025-06-24 VITALS
TEMPERATURE: 97.5 F | OXYGEN SATURATION: 99 % | WEIGHT: 225 LBS | HEART RATE: 71 BPM | RESPIRATION RATE: 14 BRPM | BODY MASS INDEX: 33.33 KG/M2 | HEIGHT: 69 IN | SYSTOLIC BLOOD PRESSURE: 130 MMHG | DIASTOLIC BLOOD PRESSURE: 89 MMHG

## 2025-06-24 DIAGNOSIS — Z12.11 ENCOUNTER FOR SCREENING FOR MALIGNANT NEOPLASM OF COLON: ICD-10-CM

## 2025-06-24 LAB
GLUCOSE SERPL-MCNC: 145 MG/DL (ref 65–140)
GLUCOSE SERPL-MCNC: 169 MG/DL (ref 65–140)

## 2025-06-24 PROCEDURE — 82948 REAGENT STRIP/BLOOD GLUCOSE: CPT

## 2025-06-24 RX ORDER — LIDOCAINE HYDROCHLORIDE 10 MG/ML
INJECTION, SOLUTION EPIDURAL; INFILTRATION; INTRACAUDAL; PERINEURAL AS NEEDED
Status: DISCONTINUED | OUTPATIENT
Start: 2025-06-24 | End: 2025-06-24

## 2025-06-24 RX ORDER — SODIUM CHLORIDE, SODIUM LACTATE, POTASSIUM CHLORIDE, CALCIUM CHLORIDE 600; 310; 30; 20 MG/100ML; MG/100ML; MG/100ML; MG/100ML
INJECTION, SOLUTION INTRAVENOUS CONTINUOUS PRN
Status: DISCONTINUED | OUTPATIENT
Start: 2025-06-24 | End: 2025-06-24

## 2025-06-24 RX ORDER — PROPOFOL 10 MG/ML
INJECTION, EMULSION INTRAVENOUS AS NEEDED
Status: DISCONTINUED | OUTPATIENT
Start: 2025-06-24 | End: 2025-06-24

## 2025-06-24 RX ADMIN — PROPOFOL 120 MG: 10 INJECTION, EMULSION INTRAVENOUS at 10:31

## 2025-06-24 RX ADMIN — Medication 40 MG: at 10:34

## 2025-06-24 RX ADMIN — LIDOCAINE HYDROCHLORIDE 50 MG: 10 INJECTION, SOLUTION EPIDURAL; INFILTRATION; INTRACAUDAL; PERINEURAL at 10:31

## 2025-06-24 RX ADMIN — SODIUM CHLORIDE, SODIUM LACTATE, POTASSIUM CHLORIDE, AND CALCIUM CHLORIDE: .6; .31; .03; .02 INJECTION, SOLUTION INTRAVENOUS at 10:26

## 2025-06-24 RX ADMIN — PROPOFOL 150 MCG/KG/MIN: 10 INJECTION, EMULSION INTRAVENOUS at 10:32

## 2025-06-24 NOTE — ANESTHESIA POSTPROCEDURE EVALUATION
Post-Op Assessment Note    CV Status:  Stable    Pain management: adequate       Mental Status:  Sleepy   Hydration Status:  Euvolemic   PONV Controlled:  Controlled   Airway Patency:  Patent  Two or more mitigation strategies used for obstructive sleep apnea   Post Op Vitals Reviewed: Yes    No anethesia notable event occurred.    Staff: CRNA           Last Filed PACU Vitals:  Vitals Value Taken Time   Temp 97.3 °F (36.3 °C) 06/24/25 10:51   Pulse 75 06/24/25 10:51   /72 06/24/25 10:51   Resp 17 06/24/25 10:51   SpO2 97 % 06/24/25 10:51

## 2025-06-24 NOTE — ANESTHESIA PREPROCEDURE EVALUATION
Procedure:  COLONOSCOPY    Relevant Problems   CARDIO   (+) HTN (hypertension)      /RENAL   (+) Chronic kidney disease      Glp-1    HYPOTHYROID      Physical Exam    Airway     Mallampati score: II  TM Distance: >3 FB  Neck ROM: full  Mouth opening: >= 4 cm      Cardiovascular  Cardiovascular exam normal    Dental   No notable dental hx     Pulmonary  Pulmonary exam normal     Neurological  - normal exam    Other Findings        Anesthesia Plan  ASA Score- 3     Anesthesia Type- IV sedation with anesthesia with ASA Monitors.         Additional Monitors:     Airway Plan: natural airway.           Plan Factors-Exercise tolerance (METS): >4 METS.    Chart reviewed.  Imaging results reviewed. Existing labs reviewed. Patient summary reviewed.    Patient is not a current smoker.      Obstructive sleep apnea risk education given perioperatively.        Induction- intravenous.    Postoperative Plan- .   Monitoring Plan - Monitoring plan - standard ASA monitoring      Perioperative Resuscitation Plan - Level 1 - Full Code.       Informed Consent- Anesthetic plan and risks discussed with patient.  I personally reviewed this patient with the CRNA. Discussed and agreed on the Anesthesia Plan with the CRNA..      NPO Status:  Vitals Value Taken Time   Date of last liquid 06/24/25 06/24/25 09:22   Time of last liquid 0300 06/24/25 09:22   Date of last solid 06/22/25 06/24/25 09:22   Time of last solid 1800 06/24/25 09:22

## 2025-06-24 NOTE — H&P
Procedure(s):  Colonoscopy with indication(s) of CRC screening        Vitals:    06/24/25 0935   BP: 124/80   Pulse: 80   Resp: 18   Temp: 97.8 °F (36.6 °C)   SpO2: 96%       Physical Exam:  Physical Exam  HENT:      Nose: Nose normal.     Eyes:      Conjunctiva/sclera: Conjunctivae normal.       Cardiovascular:      Rate and Rhythm: Normal rate.   Pulmonary:      Effort: Pulmonary effort is normal.   Abdominal:      Palpations: Abdomen is soft.     Neurological:      General: No focal deficit present.      Mental Status: He is alert.     Psychiatric:         Mood and Affect: Mood normal.              Endoscopy Pre-Procedure Assessment:  Prior to the procedure, the patient is identified.  The patient's history, medications, and allergies have been reviewed.  The patient is competent.     Consent:    We have discussed the procedure in detail. We reviewed risks, benefits and alternative as well as potentional complications including and not limited to missed lesion or polyp,medication side effect , infection, bleeding, perforation and the potential need for surgery, ICU admission, CPR, as well as the need for blood product transfusion. Patient verbalized understanding and agreement. All patient questions were answered.     After reviewed the risks and benefits, the patient is deemed in satisfactory condition to undergo the procedure.  The anesthesia plan is to use monitored anesthesia care (MAC).      06/24/25

## (undated) DEVICE — DRAPE EQUIPMENT RF WAND

## (undated) DEVICE — ANTIBACTERIAL UNDYED BRAIDED (POLYGLACTIN 910), SYNTHETIC ABSORBABLE SUTURE: Brand: COATED VICRYL

## (undated) DEVICE — SUT VICRYL 2-0 SH 27 IN UNDYED J417H

## (undated) DEVICE — NEEDLE 25G X 1 1/2

## (undated) DEVICE — SUT PDS II 2-0 SH 27 IN Z317H

## (undated) DEVICE — INTENDED FOR TISSUE SEPARATION, AND OTHER PROCEDURES THAT REQUIRE A SHARP SURGICAL BLADE TO PUNCTURE OR CUT.: Brand: BARD-PARKER SAFETY BLADES SIZE 15, STERILE

## (undated) DEVICE — SCD SEQUENTIAL COMPRESSION COMFORT SLEEVE MEDIUM KNEE LENGTH: Brand: KENDALL SCD

## (undated) DEVICE — GLOVE INDICATOR PI UNDERGLOVE SZ 6.5 BLUE

## (undated) DEVICE — SUT VICRYL 4-0 PS-2 18 IN J496G

## (undated) DEVICE — ADHESIVE SKIN HIGH VISCOSITY EXOFIN 1ML

## (undated) DEVICE — PAD GROUNDING ADULT

## (undated) DEVICE — CHLORAPREP HI-LITE 26ML ORANGE

## (undated) DEVICE — VIAL DECANTER

## (undated) DEVICE — SUT VICRYL 4-0 PS-2 27 IN J426H

## (undated) DEVICE — MEDI-VAC YANK SUCT HNDL W/TPRD BULBOUS TIP: Brand: CARDINAL HEALTH

## (undated) DEVICE — SUT VICRYL 3-0 SH 27 IN J416H

## (undated) DEVICE — BETHLEHEM UNIVERSAL MINOR GEN: Brand: CARDINAL HEALTH

## (undated) DEVICE — GLOVE SRG BIOGEL 6

## (undated) DEVICE — TUBING SUCTION 5MM X 12 FT